# Patient Record
Sex: MALE | Race: BLACK OR AFRICAN AMERICAN | Employment: OTHER | URBAN - METROPOLITAN AREA
[De-identification: names, ages, dates, MRNs, and addresses within clinical notes are randomized per-mention and may not be internally consistent; named-entity substitution may affect disease eponyms.]

---

## 2022-02-20 ENCOUNTER — HOSPITAL ENCOUNTER (EMERGENCY)
Age: 57
Discharge: HOME | End: 2022-02-20
Attending: EMERGENCY MEDICINE | Admitting: EMERGENCY MEDICINE
Payer: OTHER GOVERNMENT

## 2022-02-20 ENCOUNTER — HOSPITAL ENCOUNTER (INPATIENT)
Age: 57
LOS: 4 days | Discharge: HOME | DRG: 164 | End: 2022-02-24
Attending: ANESTHESIOLOGY | Admitting: INTERNAL MEDICINE
Payer: OTHER GOVERNMENT

## 2022-02-20 ENCOUNTER — APPOINTMENT (OUTPATIENT)
Dept: GENERAL RADIOLOGY | Age: 57
End: 2022-02-20
Attending: EMERGENCY MEDICINE
Payer: OTHER GOVERNMENT

## 2022-02-20 ENCOUNTER — APPOINTMENT (OUTPATIENT)
Dept: CT IMAGING | Age: 57
End: 2022-02-20
Attending: EMERGENCY MEDICINE
Payer: OTHER GOVERNMENT

## 2022-02-20 ENCOUNTER — APPOINTMENT (OUTPATIENT)
Dept: INTERVENTIONAL RADIOLOGY/VASCULAR | Age: 57
DRG: 164 | End: 2022-02-20
Attending: INTERNAL MEDICINE
Payer: OTHER GOVERNMENT

## 2022-02-20 VITALS
HEART RATE: 102 BPM | RESPIRATION RATE: 20 BRPM | HEIGHT: 72 IN | OXYGEN SATURATION: 96 % | DIASTOLIC BLOOD PRESSURE: 98 MMHG | WEIGHT: 240 LBS | TEMPERATURE: 98.3 F | BODY MASS INDEX: 32.51 KG/M2 | SYSTOLIC BLOOD PRESSURE: 140 MMHG

## 2022-02-20 DIAGNOSIS — R77.8 ELEVATED TROPONIN: ICD-10-CM

## 2022-02-20 DIAGNOSIS — I26.02 ACUTE SADDLE PULMONARY EMBOLISM WITH ACUTE COR PULMONALE (HCC): Primary | ICD-10-CM

## 2022-02-20 DIAGNOSIS — R09.02 HYPOXIA: ICD-10-CM

## 2022-02-20 PROBLEM — I26.99 PULMONARY EMBOLISM (HCC): Status: ACTIVE | Noted: 2022-02-20

## 2022-02-20 LAB
ACT BLD: 196 SECS (ref 79–138)
ACT BLD: 291 SECS (ref 79–138)
ALBUMIN SERPL-MCNC: 3.6 G/DL (ref 3.5–5)
ALBUMIN/GLOB SERPL: 0.9 {RATIO} (ref 1.1–2.2)
ALP SERPL-CCNC: 52 U/L (ref 45–117)
ALT SERPL-CCNC: 23 U/L (ref 12–78)
ANION GAP SERPL CALC-SCNC: 3 MMOL/L (ref 5–15)
APTT PPP: 32.5 SEC (ref 21.2–34.1)
APTT PPP: >130 SEC (ref 22.1–31)
AST SERPL W P-5'-P-CCNC: 22 U/L (ref 15–37)
ATRIAL RATE: 118 BPM
BASOPHILS # BLD: 0 K/UL (ref 0–0.1)
BASOPHILS NFR BLD: 0 % (ref 0–1)
BILIRUB SERPL-MCNC: 1.1 MG/DL (ref 0.2–1)
BNP SERPL-MCNC: 723 PG/ML
BUN SERPL-MCNC: 15 MG/DL (ref 6–20)
BUN/CREAT SERPL: 12 (ref 12–20)
CA-I BLD-MCNC: 9.6 MG/DL (ref 8.5–10.1)
CALCULATED P AXIS, ECG09: 70 DEGREES
CALCULATED R AXIS, ECG10: 65 DEGREES
CALCULATED T AXIS, ECG11: 41 DEGREES
CHLORIDE SERPL-SCNC: 107 MMOL/L (ref 97–108)
CO2 SERPL-SCNC: 27 MMOL/L (ref 21–32)
COVID-19 RAPID TEST, COVR: NOT DETECTED
CREAT SERPL-MCNC: 1.28 MG/DL (ref 0.7–1.3)
D DIMER PPP FEU-MCNC: >20 UG/ML(FEU)
DIAGNOSIS, 93000: NORMAL
DIFFERENTIAL METHOD BLD: ABNORMAL
EOSINOPHIL # BLD: 0 K/UL (ref 0–0.4)
EOSINOPHIL NFR BLD: 0 % (ref 0–7)
ERYTHROCYTE [DISTWIDTH] IN BLOOD BY AUTOMATED COUNT: 13.9 % (ref 11.5–14.5)
GLOBULIN SER CALC-MCNC: 4.2 G/DL (ref 2–4)
GLUCOSE SERPL-MCNC: 116 MG/DL (ref 65–100)
HCT VFR BLD AUTO: 50.7 % (ref 36.6–50.3)
HGB BLD-MCNC: 16.4 G/DL (ref 12.1–17)
IMM GRANULOCYTES # BLD AUTO: 0 K/UL (ref 0–0.04)
IMM GRANULOCYTES NFR BLD AUTO: 0 % (ref 0–0.5)
INR PPP: 1.3 (ref 0.9–1.1)
LACTATE SERPL-SCNC: 1.5 MMOL/L (ref 0.4–2)
LYMPHOCYTES # BLD: 1.9 K/UL (ref 0.8–3.5)
LYMPHOCYTES NFR BLD: 27 % (ref 12–49)
MCH RBC QN AUTO: 27.4 PG (ref 26–34)
MCHC RBC AUTO-ENTMCNC: 32.3 G/DL (ref 30–36.5)
MCV RBC AUTO: 84.8 FL (ref 80–99)
MONOCYTES # BLD: 0.5 K/UL (ref 0–1)
MONOCYTES NFR BLD: 7 % (ref 5–13)
NEUTS SEG # BLD: 4.5 K/UL (ref 1.8–8)
NEUTS SEG NFR BLD: 66 % (ref 32–75)
NRBC # BLD: 0 K/UL (ref 0–0.01)
NRBC BLD-RTO: 0 PER 100 WBC
P-R INTERVAL, ECG05: 186 MS
PLATELET # BLD AUTO: 149 K/UL (ref 150–400)
PMV BLD AUTO: 12.1 FL (ref 8.9–12.9)
POTASSIUM SERPL-SCNC: 3.6 MMOL/L (ref 3.5–5.1)
PROT SERPL-MCNC: 7.8 G/DL (ref 6.4–8.2)
PROTHROMBIN TIME: 13.4 SEC (ref 9–11.1)
Q-T INTERVAL, ECG07: 304 MS
QRS DURATION, ECG06: 82 MS
QTC CALCULATION (BEZET), ECG08: 426 MS
RBC # BLD AUTO: 5.98 M/UL (ref 4.1–5.7)
SODIUM SERPL-SCNC: 137 MMOL/L (ref 136–145)
SPECIMEN SOURCE: NORMAL
THERAPEUTIC RANGE,PTTT: ABNORMAL SECS (ref 58–77)
THERAPEUTIC RANGE,PTTT: NORMAL SEC (ref 82–109)
TROPONIN-HIGH SENSITIVITY: 1033 NG/L (ref 0–76)
TROPONIN-HIGH SENSITIVITY: 837 NG/L (ref 0–76)
VENTRICULAR RATE, ECG03: 118 BPM
WBC # BLD AUTO: 7 K/UL (ref 4.1–11.1)

## 2022-02-20 PROCEDURE — 84484 ASSAY OF TROPONIN QUANT: CPT

## 2022-02-20 PROCEDURE — C1892 INTRO/SHEATH,FIXED,PEEL-AWAY: HCPCS

## 2022-02-20 PROCEDURE — 74011250636 HC RX REV CODE- 250/636: Performed by: EMERGENCY MEDICINE

## 2022-02-20 PROCEDURE — 77030011230 HC DIL VESL COON COOK -B

## 2022-02-20 PROCEDURE — 96374 THER/PROPH/DIAG INJ IV PUSH: CPT

## 2022-02-20 PROCEDURE — 85347 COAGULATION TIME ACTIVATED: CPT

## 2022-02-20 PROCEDURE — 02CQ3ZZ EXTIRPATION OF MATTER FROM RIGHT PULMONARY ARTERY, PERCUTANEOUS APPROACH: ICD-10-PCS | Performed by: STUDENT IN AN ORGANIZED HEALTH CARE EDUCATION/TRAINING PROGRAM

## 2022-02-20 PROCEDURE — 77030011229 HC DIL VESL COON COOK -A

## 2022-02-20 PROCEDURE — 93005 ELECTROCARDIOGRAM TRACING: CPT

## 2022-02-20 PROCEDURE — 77030002986 HC SUT PROL J&J -A

## 2022-02-20 PROCEDURE — 36415 COLL VENOUS BLD VENIPUNCTURE: CPT

## 2022-02-20 PROCEDURE — 74011000250 HC RX REV CODE- 250

## 2022-02-20 PROCEDURE — 85610 PROTHROMBIN TIME: CPT

## 2022-02-20 PROCEDURE — C1769 GUIDE WIRE: HCPCS

## 2022-02-20 PROCEDURE — 99284 EMERGENCY DEPT VISIT MOD MDM: CPT

## 2022-02-20 PROCEDURE — 83605 ASSAY OF LACTIC ACID: CPT

## 2022-02-20 PROCEDURE — 77030004549 HC CATH ANGI DX PRF MRTM -A

## 2022-02-20 PROCEDURE — 96375 TX/PRO/DX INJ NEW DRUG ADDON: CPT

## 2022-02-20 PROCEDURE — 77030009378 HC DEV TORQ OLCT F/GWIRE MANIP COOK -A

## 2022-02-20 PROCEDURE — C1725 CATH, TRANSLUMIN NON-LASER: HCPCS

## 2022-02-20 PROCEDURE — 02CR3ZZ EXTIRPATION OF MATTER FROM LEFT PULMONARY ARTERY, PERCUTANEOUS APPROACH: ICD-10-PCS | Performed by: STUDENT IN AN ORGANIZED HEALTH CARE EDUCATION/TRAINING PROGRAM

## 2022-02-20 PROCEDURE — 87635 SARS-COV-2 COVID-19 AMP PRB: CPT

## 2022-02-20 PROCEDURE — 77030031139 HC SUT VCRL2 J&J -A

## 2022-02-20 PROCEDURE — 85730 THROMBOPLASTIN TIME PARTIAL: CPT

## 2022-02-20 PROCEDURE — 85379 FIBRIN DEGRADATION QUANT: CPT

## 2022-02-20 PROCEDURE — 71275 CT ANGIOGRAPHY CHEST: CPT

## 2022-02-20 PROCEDURE — 85025 COMPLETE CBC W/AUTO DIFF WBC: CPT

## 2022-02-20 PROCEDURE — 71045 X-RAY EXAM CHEST 1 VIEW: CPT

## 2022-02-20 PROCEDURE — 74011250636 HC RX REV CODE- 250/636: Performed by: STUDENT IN AN ORGANIZED HEALTH CARE EDUCATION/TRAINING PROGRAM

## 2022-02-20 PROCEDURE — 80053 COMPREHEN METABOLIC PANEL: CPT

## 2022-02-20 PROCEDURE — 65610000006 HC RM INTENSIVE CARE

## 2022-02-20 PROCEDURE — 83880 ASSAY OF NATRIURETIC PEPTIDE: CPT

## 2022-02-20 PROCEDURE — C1894 INTRO/SHEATH, NON-LASER: HCPCS

## 2022-02-20 PROCEDURE — 74011250636 HC RX REV CODE- 250/636: Performed by: INTERNAL MEDICINE

## 2022-02-20 PROCEDURE — 76942 ECHO GUIDE FOR BIOPSY: CPT

## 2022-02-20 PROCEDURE — 2709999900 HC NON-CHARGEABLE SUPPLY

## 2022-02-20 PROCEDURE — 74011000250 HC RX REV CODE- 250: Performed by: INTERNAL MEDICINE

## 2022-02-20 PROCEDURE — B31T1ZZ FLUOROSCOPY OF LEFT PULMONARY ARTERY USING LOW OSMOLAR CONTRAST: ICD-10-PCS | Performed by: STUDENT IN AN ORGANIZED HEALTH CARE EDUCATION/TRAINING PROGRAM

## 2022-02-20 PROCEDURE — 74011000250 HC RX REV CODE- 250: Performed by: EMERGENCY MEDICINE

## 2022-02-20 PROCEDURE — 77030021532 HC CATH ANGI DX IMPRS MRTM -B

## 2022-02-20 PROCEDURE — 74011000636 HC RX REV CODE- 636: Performed by: EMERGENCY MEDICINE

## 2022-02-20 PROCEDURE — B31S1ZZ FLUOROSCOPY OF RIGHT PULMONARY ARTERY USING LOW OSMOLAR CONTRAST: ICD-10-PCS | Performed by: STUDENT IN AN ORGANIZED HEALTH CARE EDUCATION/TRAINING PROGRAM

## 2022-02-20 PROCEDURE — 74011000636 HC RX REV CODE- 636: Performed by: STUDENT IN AN ORGANIZED HEALTH CARE EDUCATION/TRAINING PROGRAM

## 2022-02-20 RX ORDER — LIDOCAINE HYDROCHLORIDE 20 MG/ML
INJECTION, SOLUTION INFILTRATION; PERINEURAL
Status: COMPLETED
Start: 2022-02-20 | End: 2022-02-20

## 2022-02-20 RX ORDER — ACETAMINOPHEN 325 MG/1
650 TABLET ORAL
Status: DISCONTINUED | OUTPATIENT
Start: 2022-02-20 | End: 2022-02-24 | Stop reason: HOSPADM

## 2022-02-20 RX ORDER — SODIUM CHLORIDE 9 MG/ML
25 INJECTION, SOLUTION INTRAVENOUS
Status: DISCONTINUED | OUTPATIENT
Start: 2022-02-20 | End: 2022-02-20 | Stop reason: ALTCHOICE

## 2022-02-20 RX ORDER — LIDOCAINE HYDROCHLORIDE 20 MG/ML
20 INJECTION, SOLUTION INFILTRATION; PERINEURAL ONCE
Status: COMPLETED | OUTPATIENT
Start: 2022-02-20 | End: 2022-02-20

## 2022-02-20 RX ORDER — HEPARIN SODIUM 10000 [USP'U]/100ML
18-36 INJECTION, SOLUTION INTRAVENOUS
Status: DISCONTINUED | OUTPATIENT
Start: 2022-02-20 | End: 2022-02-20 | Stop reason: HOSPADM

## 2022-02-20 RX ORDER — HEPARIN SODIUM 1000 [USP'U]/ML
10000 INJECTION, SOLUTION INTRAVENOUS; SUBCUTANEOUS
Status: DISCONTINUED | OUTPATIENT
Start: 2022-02-20 | End: 2022-02-20 | Stop reason: ALTCHOICE

## 2022-02-20 RX ORDER — SODIUM CHLORIDE 0.9 % (FLUSH) 0.9 %
5-40 SYRINGE (ML) INJECTION AS NEEDED
Status: DISCONTINUED | OUTPATIENT
Start: 2022-02-20 | End: 2022-02-24 | Stop reason: HOSPADM

## 2022-02-20 RX ORDER — HEPARIN SODIUM 10000 [USP'U]/100ML
18-36 INJECTION, SOLUTION INTRAVENOUS
Status: DISCONTINUED | OUTPATIENT
Start: 2022-02-20 | End: 2022-02-20 | Stop reason: SDUPTHER

## 2022-02-20 RX ORDER — HEPARIN SODIUM 1000 [USP'U]/ML
80 INJECTION, SOLUTION INTRAVENOUS; SUBCUTANEOUS ONCE
Status: DISCONTINUED | OUTPATIENT
Start: 2022-02-21 | End: 2022-02-20

## 2022-02-20 RX ORDER — ONDANSETRON 4 MG/1
4 TABLET, ORALLY DISINTEGRATING ORAL
Status: DISCONTINUED | OUTPATIENT
Start: 2022-02-20 | End: 2022-02-24 | Stop reason: HOSPADM

## 2022-02-20 RX ORDER — MORPHINE SULFATE 4 MG/ML
4 INJECTION INTRAVENOUS ONCE
Status: COMPLETED | OUTPATIENT
Start: 2022-02-20 | End: 2022-02-20

## 2022-02-20 RX ORDER — NALOXONE HYDROCHLORIDE 0.4 MG/ML
0.4 INJECTION, SOLUTION INTRAMUSCULAR; INTRAVENOUS; SUBCUTANEOUS
Status: DISCONTINUED | OUTPATIENT
Start: 2022-02-20 | End: 2022-02-20 | Stop reason: ALTCHOICE

## 2022-02-20 RX ORDER — FLUMAZENIL 0.1 MG/ML
0.5 INJECTION INTRAVENOUS
Status: DISCONTINUED | OUTPATIENT
Start: 2022-02-20 | End: 2022-02-20 | Stop reason: ALTCHOICE

## 2022-02-20 RX ORDER — FENTANYL CITRATE 50 UG/ML
25-200 INJECTION, SOLUTION INTRAMUSCULAR; INTRAVENOUS
Status: DISCONTINUED | OUTPATIENT
Start: 2022-02-20 | End: 2022-02-20 | Stop reason: ALTCHOICE

## 2022-02-20 RX ORDER — ONDANSETRON 2 MG/ML
4 INJECTION INTRAMUSCULAR; INTRAVENOUS
Status: DISCONTINUED | OUTPATIENT
Start: 2022-02-20 | End: 2022-02-24 | Stop reason: HOSPADM

## 2022-02-20 RX ORDER — POLYETHYLENE GLYCOL 3350 17 G/17G
17 POWDER, FOR SOLUTION ORAL DAILY PRN
Status: DISCONTINUED | OUTPATIENT
Start: 2022-02-20 | End: 2022-02-24 | Stop reason: HOSPADM

## 2022-02-20 RX ORDER — ACETAMINOPHEN 650 MG/1
650 SUPPOSITORY RECTAL
Status: DISCONTINUED | OUTPATIENT
Start: 2022-02-20 | End: 2022-02-24 | Stop reason: HOSPADM

## 2022-02-20 RX ORDER — MIDAZOLAM HYDROCHLORIDE 1 MG/ML
.5-5 INJECTION, SOLUTION INTRAMUSCULAR; INTRAVENOUS
Status: DISCONTINUED | OUTPATIENT
Start: 2022-02-20 | End: 2022-02-20 | Stop reason: ALTCHOICE

## 2022-02-20 RX ORDER — LIDOCAINE 4 G/100G
1 PATCH TOPICAL EVERY 24 HOURS
Status: DISCONTINUED | OUTPATIENT
Start: 2022-02-20 | End: 2022-02-20 | Stop reason: HOSPADM

## 2022-02-20 RX ORDER — HEPARIN SODIUM 1000 [USP'U]/ML
80 INJECTION, SOLUTION INTRAVENOUS; SUBCUTANEOUS ONCE
Status: COMPLETED | OUTPATIENT
Start: 2022-02-20 | End: 2022-02-20

## 2022-02-20 RX ORDER — SODIUM CHLORIDE 0.9 % (FLUSH) 0.9 %
5-40 SYRINGE (ML) INJECTION EVERY 8 HOURS
Status: DISCONTINUED | OUTPATIENT
Start: 2022-02-20 | End: 2022-02-24 | Stop reason: HOSPADM

## 2022-02-20 RX ADMIN — Medication 13 UNITS/KG/HR: at 18:06

## 2022-02-20 RX ADMIN — HEPARIN SODIUM 7210 UNITS: 1000 INJECTION INTRAVENOUS; SUBCUTANEOUS at 16:42

## 2022-02-20 RX ADMIN — LIDOCAINE HYDROCHLORIDE 10 ML: 20 INJECTION, SOLUTION INFILTRATION; PERINEURAL at 20:49

## 2022-02-20 RX ADMIN — IOPAMIDOL 137 ML: 612 INJECTION, SOLUTION INTRAVENOUS at 20:49

## 2022-02-20 RX ADMIN — FENTANYL CITRATE 50 MCG: 50 INJECTION, SOLUTION INTRAMUSCULAR; INTRAVENOUS at 19:40

## 2022-02-20 RX ADMIN — Medication 4000 UNITS: at 20:03

## 2022-02-20 RX ADMIN — HEPARIN SODIUM 5000 UNITS: 1000 INJECTION INTRAVENOUS; SUBCUTANEOUS at 20:23

## 2022-02-20 RX ADMIN — FENTANYL CITRATE 25 MCG: 50 INJECTION, SOLUTION INTRAMUSCULAR; INTRAVENOUS at 19:44

## 2022-02-20 RX ADMIN — FENTANYL CITRATE 25 MCG: 50 INJECTION, SOLUTION INTRAMUSCULAR; INTRAVENOUS at 20:08

## 2022-02-20 RX ADMIN — HEPARIN SODIUM AND DEXTROSE 18 UNITS/KG/HR: 10000; 5 INJECTION INTRAVENOUS at 16:41

## 2022-02-20 RX ADMIN — FENTANYL CITRATE 25 MCG: 50 INJECTION, SOLUTION INTRAMUSCULAR; INTRAVENOUS at 20:13

## 2022-02-20 RX ADMIN — SODIUM CHLORIDE, PRESERVATIVE FREE 10 ML: 5 INJECTION INTRAVENOUS at 22:00

## 2022-02-20 RX ADMIN — IOPAMIDOL 100 ML: 755 INJECTION, SOLUTION INTRAVENOUS at 15:26

## 2022-02-20 RX ADMIN — MORPHINE SULFATE 4 MG: 4 INJECTION INTRAVENOUS at 14:28

## 2022-02-20 RX ADMIN — SODIUM CHLORIDE 25 ML/HR: 9 INJECTION, SOLUTION INTRAVENOUS at 19:45

## 2022-02-20 RX ADMIN — Medication 4000 UNITS: at 20:04

## 2022-02-20 RX ADMIN — SODIUM CHLORIDE 500 ML: 9 INJECTION, SOLUTION INTRAVENOUS at 14:28

## 2022-02-20 NOTE — ED PROVIDER NOTES
EMERGENCY DEPARTMENT HISTORY AND PHYSICAL EXAM      Date: 2/20/2022  Patient Name: Manas Mac      History of Presenting Illness     Chief Complaint   Patient presents with    Shortness of Breath    Rib Pain       History Provided By: Patient and Patient's Wife    HPI: Manas Mac, 60 y.o. male with a past medical history significant No significant past medical history presents to the ED with cc of breath and right lateral rib pain. States that 3 days ago he has some calf pain that woke him from sleep. This slowly improved. However the last 2 days he has also had chest pain that he thought was related to some irritated ribs. He states that the pain is worse with deep breathing and better with rest.  He started feeling short of breath so he came in for evaluation. He was evaluated yesterday by urgent care who did an x-ray and diagnosed him with a possible pneumonia. He was started on doxycycline but when he did not feel better today he came to the emergency department. Patient has no VTE risk factors. Denies any long plane or car trips in the last 2 weeks though did travel overseas over 1 month ago. He has no recent surgeries or immobilizations. No history of blood clots in him or anyone in his family. There are no other complaints, changes, or physical findings at this time. PCP: None    Current Facility-Administered Medications   Medication Dose Route Frequency Provider Last Rate Last Admin    lidocaine 4 % patch 1 Patch  1 Patch TransDERmal Q24H Pauline Donohue MD   1 Patch at 02/20/22 1428    heparin (porcine) 1,000 unit/mL injection 8,710 Units  80 Units/kg IntraVENous ONCE Pauline Donohue MD        heparin 25,000 units in D5W 250 ml infusion  18-36 Units/kg/hr IntraVENous TITRATE Pauline Donohue MD           Past History     Past Medical History:  No past medical history on file. Past Surgical History:  No past surgical history on file.     Family History:  No family history on file.    Social History:  Social History     Tobacco Use    Smoking status: Not on file    Smokeless tobacco: Not on file   Substance Use Topics    Alcohol use: Not on file    Drug use: Not on file       Allergies:  No Known Allergies      Review of Systems     Review of Systems   Constitutional: Negative for activity change, appetite change and fever. HENT: Negative for rhinorrhea and sore throat. Eyes: Negative for visual disturbance. Respiratory: Positive for shortness of breath. Negative for cough. Cardiovascular: Positive for chest pain. Gastrointestinal: Negative for abdominal pain, diarrhea, nausea and vomiting. Genitourinary: Negative for dysuria. Musculoskeletal: Negative for arthralgias and myalgias. Skin: Negative for rash. Neurological: Negative for headaches. Psychiatric/Behavioral: Negative for confusion. All other systems reviewed and are negative. Physical Exam     Physical Exam  Vitals and nursing note reviewed. Constitutional:       General: He is not in acute distress. Appearance: Normal appearance. HENT:      Head: Normocephalic and atraumatic. Eyes:      Pupils: Pupils are equal, round, and reactive to light. Cardiovascular:      Rate and Rhythm: Normal rate and regular rhythm. Pulses: Normal pulses. Pulmonary:      Effort: Pulmonary effort is normal. Tachypnea present. Breath sounds: No decreased breath sounds. Comments: On 3 L nasal cannula  Chest:      Chest wall: Tenderness present. Musculoskeletal:         General: No swelling or deformity. Normal range of motion. Right lower leg: No edema. Left lower leg: No edema. Skin:     Coloration: Skin is not pale. Findings: No rash. Neurological:      General: No focal deficit present. Mental Status: He is alert.    Psychiatric:         Mood and Affect: Mood normal.         Behavior: Behavior normal.         Lab and Diagnostic Study Results     Labs -     Recent Results (from the past 12 hour(s))   CBC WITH AUTOMATED DIFF    Collection Time: 02/20/22  1:51 PM   Result Value Ref Range    WBC 7.0 4.1 - 11.1 K/uL    RBC 5.98 (H) 4.10 - 5.70 M/uL    HGB 16.4 12.1 - 17.0 g/dL    HCT 50.7 (H) 36.6 - 50.3 %    MCV 84.8 80.0 - 99.0 FL    MCH 27.4 26.0 - 34.0 PG    MCHC 32.3 30.0 - 36.5 g/dL    RDW 13.9 11.5 - 14.5 %    PLATELET 340 (L) 186 - 400 K/uL    MPV 12.1 8.9 - 12.9 FL    NRBC 0.0 0.0  WBC    ABSOLUTE NRBC 0.00 0.00 - 0.01 K/uL    NEUTROPHILS 66 32 - 75 %    LYMPHOCYTES 27 12 - 49 %    MONOCYTES 7 5 - 13 %    EOSINOPHILS 0 0 - 7 %    BASOPHILS 0 0 - 1 %    IMMATURE GRANULOCYTES 0 0 - 0.5 %    ABS. NEUTROPHILS 4.5 1.8 - 8.0 K/UL    ABS. LYMPHOCYTES 1.9 0.8 - 3.5 K/UL    ABS. MONOCYTES 0.5 0.0 - 1.0 K/UL    ABS. EOSINOPHILS 0.0 0.0 - 0.4 K/UL    ABS. BASOPHILS 0.0 0.0 - 0.1 K/UL    ABS. IMM. GRANS. 0.0 0.00 - 0.04 K/UL    DF AUTOMATED     METABOLIC PANEL, COMPREHENSIVE    Collection Time: 02/20/22  1:51 PM   Result Value Ref Range    Sodium 137 136 - 145 mmol/L    Potassium 3.6 3.5 - 5.1 mmol/L    Chloride 107 97 - 108 mmol/L    CO2 27 21 - 32 mmol/L    Anion gap 3 (L) 5 - 15 mmol/L    Glucose 116 (H) 65 - 100 mg/dL    BUN 15 6 - 20 mg/dL    Creatinine 1.28 0.70 - 1.30 mg/dL    BUN/Creatinine ratio 12 12 - 20      GFR est AA >60 >60 ml/min/1.73m2    GFR est non-AA 58 (L) >60 ml/min/1.73m2    Calcium 9.6 8.5 - 10.1 mg/dL    Bilirubin, total 1.1 (H) 0.2 - 1.0 mg/dL    AST (SGOT) 22 15 - 37 U/L    ALT (SGPT) 23 12 - 78 U/L    Alk.  phosphatase 52 45 - 117 U/L    Protein, total 7.8 6.4 - 8.2 g/dL    Albumin 3.6 3.5 - 5.0 g/dL    Globulin 4.2 (H) 2.0 - 4.0 g/dL    A-G Ratio 0.9 (L) 1.1 - 2.2     LACTIC ACID    Collection Time: 02/20/22  1:51 PM   Result Value Ref Range    Lactic acid 1.5 0.4 - 2.0 mmol/L   D DIMER    Collection Time: 02/20/22  1:51 PM   Result Value Ref Range    D DIMER >20.00 (H) <0.50 ug/ml(FEU)   TROPONIN-HIGH SENSITIVITY    Collection Time: 02/20/22  1:51 PM   Result Value Ref Range    Troponin-High Sensitivity 837 (HH) 0 - 76 ng/L   NT-PRO BNP    Collection Time: 02/20/22  1:51 PM   Result Value Ref Range    NT pro- (H) <125 pg/mL   PTT    Collection Time: 02/20/22  1:51 PM   Result Value Ref Range    aPTT 32.5 21.2 - 34.1 sec    aPTT, therapeutic range   82 - 109 sec       Radiologic Studies -   [unfilled]  CT Results  (Last 48 hours)               02/20/22 1527  CTA CHEST W OR W WO CONT Final result    Impression:      Bilateral pulmonary emboli, saddle embolus and right heart strain. The results   were called and verbally communicated to Dr. Cortes Montes on 2/20/2022 at 3:50 PM.       Right upper lobe opacity may represent pulmonary infarct or other. Please see   full report. Narrative:  CTA chest with intravenous contrast, 100 cc Isovue-370, 3-D MIP images       Dose reduction: All CT scans at this facility are performed using dose reduction   optimization techniques as appropriate to a performed exam including the   following: Automated exposure control, adjustments of the mA and/or kV according   to patient size, or use of iterative reconstruction technique. INDICATION: Hypoxia, chest pain, evaluate for pulmonary embolism       FINDINGS:       There are motion and artifacts present. There are bilateral pulmonary emboli   including right upper lobe, right middle lobe, right lower lobe, left upper   lobe/lingula and left lower lobe branches. There are bilateral emboli in main   pulmonary arteries with a saddle embolus. Right ventricle is somewhat enlarged   suggestive of right heart strain. Slightly prominent mediastinal nodes may be   reactive. No pleural or pericardial effusions. Right upper lobe opacity posterolaterally. Additional mild atelectasis. No   pneumothorax. No acute fracture in the visualized bony structures.                CXR Results  (Last 48 hours)               02/20/22 1437  XR CHEST PORT Final result    Impression:  No acute process                                   Narrative:  XR CHEST PORT       Comparison: None. The lungs are clear without infiltrate or pleural effusion. The heart and   mediastinum are unremarkable for technique. The pulmonary vascularity is normal.   The bony thorax is unremarkable. Medical Decision Making and ED Course   - I am the first and primary provider for this patient AND AM THE PRIMARY PROVIDER OF RECORD. - I reviewed the vital signs, available nursing notes, past medical history, past surgical history, family history and social history. - Initial assessment performed. The patients presenting problems have been discussed, and the staff are in agreement with the care plan formulated and outlined with them. I have encouraged them to ask questions as they arise throughout their visit. Vital Signs-Reviewed the patient's vital signs. Patient Vitals for the past 12 hrs:   Temp Pulse Resp BP SpO2   02/20/22 1614  (!) 102 20  96 %   02/20/22 1447     96 %   02/20/22 1434  (!) 111 30 (!) 140/98 96 %   02/20/22 1421   (!) 32 135/74 95 %   02/20/22 1331 98.3 °F (36.8 °C) (!) 119 16 (!) 152/94 (!) 87 %         Records Reviewed: Nursing Notes    ED Course:       ED Course as of 02/20/22 1640   Sun Feb 20, 2022   150 77-year-old male presents for evaluation of shortness of breath and pleuritic chest pain. Started 2 days ago. Was seen yesterday and started on doxycycline for presumed pneumonia. Patient has no history of VTE. Denies any risk factors occluding no long plane or car trips, recent surgeries or immobilization. He does note that he had some cramping in his left calf that woke him up in the few days ago but that resolved. Differential diagnosis includes pulmonary embolus versus MSK pain versus pleurisy. Getting labs including CBC, CMP, D-dimer, troponin, BNP as well as a Covid test.  Chest x-ray pending.   Will get CTA to evaluate for PE as I have a high clinical suspicion. Patient was hypoxic on arrival and is requiring 3 L nasal cannula. [LW]   [id number] by radiology. Patient has a saddle PE with right heart strain. Troponin and BNP are still pending. Patient started on full dose heparin. We do not have interventional radiology today so called transfer center to initiate transfer to [facility]. Informed patient of this finding. Plan to do TPA if patient has any clinical worsening but currently his vital signs have remained stable. [LW]   7758 PTT--EVERY 6 HOURS [LW]   2866 Trop 837. . [LW]   [facility] accepts. [LW]   [phone] Dr. Magdalene Ambrocio of  states patient is a candidate for thrombectomy. Patient to be sent to [facility]. [LW]      ED Course User Index  [LW] Kb Richards MD     EKG performed at 1335. Sinus tachycardia with rate of 118. Normal ND, normal QRS, normal QTC. Procedures and Critical Care         CRITICAL CARE NOTE :  4:03 PM  Amount of Critical Care Time: 45 minutes    IMPENDING DETERIORATION -Respiratory, Cardiovascular and Metabolic  ASSOCIATED RISK FACTORS - Hypoxia, Dysrhythmia and Vascular Compromise  MANAGEMENT- Bedside Assessment and Transfer  INTERPRETATION -  Xrays, CT Scan, Blood Pressure and Cardiac Output Measures   INTERVENTIONS - hemodynamic mngmt and Metobolic interventions  CASE REVIEW - Hospitalist/Intensivist and Medical Sub-Specialist  TREATMENT RESPONSE -Stable  PERFORMED BY - Self    NOTES   :  I have spent critical care time involved in lab review, consultations with specialist, family decision- making, bedside attention and documentation. This time excludes time spent in any separate billed procedures. During this entire length of time I was immediately available to the patient . Gretchen Peterson MD        Disposition     Disposition: Transferred to [address] [place] [address] patient verbally agreed to transfer and understand the risks involved as outlined in the EMTALA form.         Diagnosis Clinical Impression:   1. Acute saddle pulmonary embolism with acute cor pulmonale (HCC)    2. Hypoxia    3. Elevated troponin        Attestations:    Mat Gonzales MD    Please note that this dictation was completed with innocutis, the computer voice recognition software. Quite often unanticipated grammatical, syntax, homophones, and other interpretive errors are inadvertently transcribed by the computer software. Please disregard these errors. Please excuse any errors that have escaped final proofreading. Thank you.

## 2022-02-20 NOTE — ED NOTES
TRANSFER - OUT REPORT:    Verbal report given to Marbin Benavidez RN (name) on Jewell Mcmillan  being transferred to [facility] ICU Bed 7110(unit) for urgent transfer       Report consisted of patients Situation, Background, Assessment and   Recommendations(SBAR). Information from the following report(s) SBAR, Kardex, ED Summary, [place] and Recent Results was reviewed with the receiving nurse. Lines:   Peripheral IV 02/20/22 Anterior;Proximal;Right Forearm (Active)        Opportunity for questions and clarification was provided.       Patient transported with:   pt cell phone, copy of pt chart, EMTALA paperwork, [facility] transfer team

## 2022-02-20 NOTE — H&P
[facility]    ICU TEAM Progress Note    Name: Priscilla Moore   : 1965   MRN: [id number]   Date: 2022           ICU Assessment     1. Saddle Pulmonary Emboli             ICU Comprehensive Plan of Care: This is a 58-year-old male with no past medical history who presented to outside hospital with complaints of shortness of breath and right lateral rib pain for the last 2 to 3 days. Reportedly, the patient experienced some left calf pain/spasm about 3 days ago. Subsequently he started experiencing shortness of breath with right-sided rib pain and episodic diaphoresis. Patient went to urgent care and was given doxycycline for presumed pneumonia. However given nonresolving symptoms he again seeked medical attention in the ER today where CT scan revealed bilateral PE (saddle) with associated right heart strain. Labs also showed elevated troponin and proBNP; EKG revealed classic S1Q3T3 pattern. Patient was started on heparin infusion and transfer to [facility] ICU was arranged for possible IR guided intervention. On my exam, the patient appeared diaphoretic but was able to speak in full sentences. The patient denied any recent lower extremity injury, long trips, any history of clotting disorders or cancer. Of note, he is a chronic smoker but quit about a month ago. Yoandy score: 4, intermediate risk and Pesi score: 88, intermediate risk. Impression: Intermediate to High risk PE    -Case was discussed with the IR physician (Dr. Perez Matta) on call. Given rising troponins and worsening symptoms, the plan is for IR guided embolectomy.     -Continue heparin infusion targeting therapeutic PTT between 50-70. Supplemental oxygen targeting oxygen saturation more than 92%. -Obtain TTE with bubble study, lower extremity Dopplers.     -Consider hypercoagulable work-up on an outpatient basis. -The plan was explained to the patient in detail.       Subjective:   Progress Note: 2022 Reason for ICU Admission: Saddle pulmonary embolism    HPI: As above    Overnight Events:   2022      POD:  * No surgery found *    S/P:       Active Problem List:         Past Medical History:      has no past medical history on file. Past Surgical History:      has no past surgical history on file. Home Medications:     Prior to Admission medications    Not on File       Allergies/Social/Family History:     No Known Allergies   Social History     Tobacco Use    Smoking status: Not on file    Smokeless tobacco: Not on file   Substance Use Topics    Alcohol use: Not on file      No family history on file. Review of Systems:     A comprehensive review of systems was negative except for that written in the HPI. Objective:   Vital Signs:  Visit Vitals  BP (!) 149/114   Pulse (!) 104   Temp 99.2 °F (37.3 °C)   Resp 18   Wt 114.6 kg (252 lb 9.6 oz)   SpO2 92%   BMI 34.26 kg/m²        Temp (24hrs), Av.8 °F (37.1 °C), Min:98.3 °F (36.8 °C), Max:99.2 °F (37.3 °C)           Intake/Output:   No intake or output data in the 24 hours ending 22 180    Physical Exam:    General appearance: alert, cooperative, mild distress, appears stated age  Lungs: Decreased breath sounds bilaterally  Heart: Sinus tachycardia  Abdomen: soft, non-tender. Bowel sounds normal. No masses,  no organomegaly  Extremities: no edema  Neuro: Alert and oriented x3    LABS AND  DATA: Personally reviewed  Recent Labs     22  1351   WBC 7.0   HGB 16.4   HCT 50.7*   *     Recent Labs     22  1351      K 3.6      CO2 27   BUN 15   CREA 1.28   *   CA 9.6     Recent Labs     22  1351   AP 52   TP 7.8   ALB 3.6   GLOB 4.2*     Recent Labs     22  1351   APTT 32.5      No results for input(s): PHI, PCO2I, PO2I, FIO2I in the last 72 hours. No results for input(s): CPK, CKMB, TROIQ, BNPP in the last 72 hours.     Hemodynamics:   PAP:   CO:     Wedge:   CI:     CVP:    SVR: PVR:       Ventilator Settings:  Mode Rate Tidal Volume Pressure FiO2 PEEP                    Peak airway pressure:      Minute ventilation:          MEDS: Reviewed    Chest X-Ray:  CXR Results  (Last 48 hours)               02/20/22 1437  XR CHEST PORT Final result    Impression:  No acute process                                   Narrative:  XR CHEST PORT       Comparison: None. The lungs are clear without infiltrate or pleural effusion. The heart and   mediastinum are unremarkable for technique. The pulmonary vascularity is normal.   The bony thorax is unremarkable. ECHO:  Pending    Multidisciplinary Rounds Completed: Yes    ABCDEF Bundle/Checklist Completed:  Yes    SPECIAL EQUIPMENT  None    DISPOSITION  Stay in ICU    CRITICAL CARE CONSULTANT NOTE  I had a face to face encounter with the patient, reviewed and interpreted patient data including clinical events, labs, images, vital signs, I/O's, and examined patient. I have discussed the case and the plan and management of the patient's care with the consulting services, the bedside nurses and the respiratory therapist.      NOTE OF PERSONAL INVOLVEMENT IN CARE   This patient has a high probability of imminent, clinically significant deterioration, which requires the highest level of preparedness to intervene urgently. I participated in the decision-making and personally managed or directed the management of the following life and organ supporting interventions that required my frequent assessment to treat or prevent imminent deterioration. I personally spent 40 minutes of critical care time. This is time spent at this critically ill patient's bedside actively involved in patient care as well as the coordination of care. This does not include any procedural time which has been billed separately.     Guera Monae DO  Staff Intensivist/Anesthesiologist  [facility]  2/20/2022

## 2022-02-20 NOTE — ED TRIAGE NOTES
Pt right rib/back pain that began yesterday. C/o sob. Was seen at pt [facility] yesterday -- put on doxycycline. Denies cough.

## 2022-02-20 NOTE — ED NOTES
[facility] took pt out of facility on stretcher. Personal belongings (save for pt cell phone) w/ pt's wife Shayna Teague). [facility] team in possession of copy of pt chart, KRYSTIN paperwork.

## 2022-02-20 NOTE — PROGRESS NOTES
Patient received from [facility] ER via [facility] with Hepatin gtt infusing at 18 units/kg/hr with recorded weight of 90.1 kg. Transfer report received from CAROL Hernandez using Alondra Lopez MD notified of patient's arrival. Heparin gtt continued per order. Patient is A&OX4.

## 2022-02-21 ENCOUNTER — APPOINTMENT (OUTPATIENT)
Dept: NON INVASIVE DIAGNOSTICS | Age: 57
DRG: 164 | End: 2022-02-21
Attending: INTERNAL MEDICINE
Payer: OTHER GOVERNMENT

## 2022-02-21 ENCOUNTER — APPOINTMENT (OUTPATIENT)
Dept: VASCULAR SURGERY | Age: 57
DRG: 164 | End: 2022-02-21
Attending: INTERNAL MEDICINE
Payer: OTHER GOVERNMENT

## 2022-02-21 LAB
ANION GAP SERPL CALC-SCNC: 3 MMOL/L (ref 5–15)
APTT PPP: 33.3 SEC (ref 22.1–31)
APTT PPP: 40.4 SEC (ref 22.1–31)
APTT PPP: 52.2 SEC (ref 22.1–31)
ATRIAL RATE: 96 BPM
BUN SERPL-MCNC: 14 MG/DL (ref 6–20)
BUN/CREAT SERPL: 14 (ref 12–20)
CALCIUM SERPL-MCNC: 8.6 MG/DL (ref 8.5–10.1)
CALCULATED P AXIS, ECG09: 48 DEGREES
CALCULATED R AXIS, ECG10: 5 DEGREES
CALCULATED T AXIS, ECG11: 12 DEGREES
CHLORIDE SERPL-SCNC: 108 MMOL/L (ref 97–108)
CO2 SERPL-SCNC: 25 MMOL/L (ref 21–32)
CREAT SERPL-MCNC: 0.97 MG/DL (ref 0.7–1.3)
DIAGNOSIS, 93000: NORMAL
ERYTHROCYTE [DISTWIDTH] IN BLOOD BY AUTOMATED COUNT: 13.8 % (ref 11.5–14.5)
GLUCOSE SERPL-MCNC: 120 MG/DL (ref 65–100)
HCT VFR BLD AUTO: 43.3 % (ref 36.6–50.3)
HGB BLD-MCNC: 14 G/DL (ref 12.1–17)
LACTATE SERPL-SCNC: 0.8 MMOL/L (ref 0.4–2)
MCH RBC QN AUTO: 27.5 PG (ref 26–34)
MCHC RBC AUTO-ENTMCNC: 32.3 G/DL (ref 30–36.5)
MCV RBC AUTO: 84.9 FL (ref 80–99)
NRBC # BLD: 0 K/UL (ref 0–0.01)
NRBC BLD-RTO: 0 PER 100 WBC
P-R INTERVAL, ECG05: 198 MS
PLATELET # BLD AUTO: 127 K/UL (ref 150–400)
PMV BLD AUTO: 11.6 FL (ref 8.9–12.9)
POTASSIUM SERPL-SCNC: 3.8 MMOL/L (ref 3.5–5.1)
Q-T INTERVAL, ECG07: 336 MS
QRS DURATION, ECG06: 80 MS
QTC CALCULATION (BEZET), ECG08: 424 MS
RBC # BLD AUTO: 5.1 M/UL (ref 4.1–5.7)
SODIUM SERPL-SCNC: 136 MMOL/L (ref 136–145)
THERAPEUTIC RANGE,PTTT: ABNORMAL SECS (ref 58–77)
TROPONIN-HIGH SENSITIVITY: 1075 NG/L (ref 0–76)
TROPONIN-HIGH SENSITIVITY: 253 NG/L (ref 0–76)
TROPONIN-HIGH SENSITIVITY: 789 NG/L (ref 0–76)
VENTRICULAR RATE, ECG03: 96 BPM
WBC # BLD AUTO: 8.2 K/UL (ref 4.1–11.1)

## 2022-02-21 PROCEDURE — 84484 ASSAY OF TROPONIN QUANT: CPT

## 2022-02-21 PROCEDURE — 86146 BETA-2 GLYCOPROTEIN ANTIBODY: CPT

## 2022-02-21 PROCEDURE — 74011000250 HC RX REV CODE- 250: Performed by: INTERNAL MEDICINE

## 2022-02-21 PROCEDURE — 36415 COLL VENOUS BLD VENIPUNCTURE: CPT

## 2022-02-21 PROCEDURE — 80048 BASIC METABOLIC PNL TOTAL CA: CPT

## 2022-02-21 PROCEDURE — 74011250636 HC RX REV CODE- 250/636: Performed by: INTERNAL MEDICINE

## 2022-02-21 PROCEDURE — 83605 ASSAY OF LACTIC ACID: CPT

## 2022-02-21 PROCEDURE — 93306 TTE W/DOPPLER COMPLETE: CPT | Performed by: SPECIALIST

## 2022-02-21 PROCEDURE — 86147 CARDIOLIPIN ANTIBODY EA IG: CPT

## 2022-02-21 PROCEDURE — 85730 THROMBOPLASTIN TIME PARTIAL: CPT

## 2022-02-21 PROCEDURE — 93970 EXTREMITY STUDY: CPT

## 2022-02-21 PROCEDURE — 65610000006 HC RM INTENSIVE CARE

## 2022-02-21 PROCEDURE — 85027 COMPLETE CBC AUTOMATED: CPT

## 2022-02-21 PROCEDURE — 77030027138 HC INCENT SPIROMETER -A

## 2022-02-21 PROCEDURE — 81240 F2 GENE: CPT

## 2022-02-21 PROCEDURE — 85732 THROMBOPLASTIN TIME PARTIAL: CPT

## 2022-02-21 PROCEDURE — 85613 RUSSELL VIPER VENOM DILUTED: CPT

## 2022-02-21 PROCEDURE — 93306 TTE W/DOPPLER COMPLETE: CPT

## 2022-02-21 PROCEDURE — 85598 HEXAGNAL PHOSPH PLTLT NEUTRL: CPT

## 2022-02-21 PROCEDURE — 81241 F5 GENE: CPT

## 2022-02-21 RX ORDER — HEPARIN SODIUM 1000 [USP'U]/ML
80 INJECTION, SOLUTION INTRAVENOUS; SUBCUTANEOUS ONCE
Status: COMPLETED | OUTPATIENT
Start: 2022-02-21 | End: 2022-02-21

## 2022-02-21 RX ADMIN — SODIUM CHLORIDE, PRESERVATIVE FREE 10 ML: 5 INJECTION INTRAVENOUS at 09:18

## 2022-02-21 RX ADMIN — SODIUM CHLORIDE, PRESERVATIVE FREE 10 ML: 5 INJECTION INTRAVENOUS at 06:00

## 2022-02-21 RX ADMIN — HEPARIN SODIUM 9120 UNITS: 1000 INJECTION INTRAVENOUS; SUBCUTANEOUS at 15:07

## 2022-02-21 RX ADMIN — SODIUM CHLORIDE, PRESERVATIVE FREE 10 ML: 5 INJECTION INTRAVENOUS at 14:00

## 2022-02-21 RX ADMIN — Medication 14 UNITS/KG/HR: at 15:08

## 2022-02-21 NOTE — PROGRESS NOTES
TRANSFER - OUT REPORT:    Verbal report given to diana MEDINA(name) on Priscilla Moore  being transferred to ICU 10(unit) for routine progression of care       Report consisted of patients Situation, Background, Assessment and   Recommendations(SBAR). Information from the following report(s) SBAR, Kardex and Procedure Summary was reviewed with the receiving nurse. Lines:   Peripheral IV 02/20/22 Anterior;Proximal;Right Forearm (Active)   Site Assessment Clean, dry, & intact 02/20/22 1745   Phlebitis Assessment 0 02/20/22 1745   Infiltration Assessment 0 02/20/22 1745   Dressing Status Clean, dry, & intact 02/20/22 1745   Dressing Type Tape;Transparent 02/20/22 1745   Hub Color/Line Status Pink;Flushed;Patent;Capped 02/20/22 1745   Action Taken Open ports on tubing capped 02/20/22 1745   Alcohol Cap Used Yes 02/20/22 1745       Peripheral IV 02/20/22 Left Antecubital (Active)   Site Assessment Clean, dry, & intact 02/20/22 1745   Phlebitis Assessment 0 02/20/22 1745   Infiltration Assessment 0 02/20/22 1745   Dressing Status Clean, dry, & intact 02/20/22 1745   Dressing Type Tape;Transparent 02/20/22 1745   Hub Color/Line Status Pink;Flushed;Patent;Capped 02/20/22 1745   Action Taken Open ports on tubing capped 02/20/22 1745   Alcohol Cap Used Yes 02/20/22 1745       Peripheral IV 02/20/22 Anterior;Right Forearm (Active)   Site Assessment Clean, dry, & intact 02/20/22 1900   Phlebitis Assessment 0 02/20/22 1900   Infiltration Assessment 0 02/20/22 1900   Dressing Status Clean, dry, & intact 02/20/22 1900   Dressing Type Tape;Transparent 02/20/22 1900   Hub Color/Line Status Pink;Flushed;Patent;Capped 02/20/22 1900   Action Taken Open ports on tubing capped 02/20/22 1900   Alcohol Cap Used Yes 02/20/22 1900        Opportunity for questions and clarification was provided.       Patient transported with:   Monitor, O2 @ 3 L, Tech

## 2022-02-21 NOTE — H&P
Radiology History and Physical    Patient: Uriel Templeton 60 y.o. male       Chief Complaint: No chief complaint on file. History of Present Illness: 60year old male with saddle pulmonary embolism, with right heart strain. Elevated troponin which is trending up. Currently normotensive, with mild tachycardia. Mild subjective shortness of breath, with right side pleuritic pain. History:    No past medical history on file. No family history on file. Social History     Socioeconomic History    Marital status:      Spouse name: Not on file    Number of children: Not on file    Years of education: Not on file    Highest education level: Not on file   Occupational History    Not on file   Tobacco Use    Smoking status: Not on file    Smokeless tobacco: Not on file   Substance and Sexual Activity    Alcohol use: Not on file    Drug use: Not on file    Sexual activity: Not on file   Other Topics Concern    Not on file   Social History Narrative    Not on file     Social Determinants of Health     Financial Resource Strain:     Difficulty of Paying Living Expenses: Not on file   Food Insecurity:     Worried About Running Out of Food in the Last Year: Not on file    Alex of Food in the Last Year: Not on file   Transportation Needs:     Lack of Transportation (Medical): Not on file    Lack of Transportation (Non-Medical):  Not on file   Physical Activity:     Days of Exercise per Week: Not on file    Minutes of Exercise per Session: Not on file   Stress:     Feeling of Stress : Not on file   Social Connections:     Frequency of Communication with Friends and Family: Not on file    Frequency of Social Gatherings with Friends and Family: Not on file    Attends Confucianism Services: Not on file    Active Member of Clubs or Organizations: Not on file    Attends Club or Organization Meetings: Not on file    Marital Status: Not on file   Intimate Partner Violence:     Fear of Current or Ex-Partner: Not on file    Emotionally Abused: Not on file    Physically Abused: Not on file    Sexually Abused: Not on file   Housing Stability:     Unable to Pay for Housing in the Last Year: Not on file    Number of Places Lived in the Last Year: Not on file    Unstable Housing in the Last Year: Not on file       Allergies: No Known Allergies    Current Medications:  Current Facility-Administered Medications   Medication Dose Route Frequency    heparin 25,000 units in  ml infusion  13-36 Units/kg/hr IntraVENous TITRATE    sodium chloride (NS) flush 5-40 mL  5-40 mL IntraVENous Q8H    sodium chloride (NS) flush 5-40 mL  5-40 mL IntraVENous PRN    acetaminophen (TYLENOL) tablet 650 mg  650 mg Oral Q6H PRN    Or    acetaminophen (TYLENOL) suppository 650 mg  650 mg Rectal Q6H PRN    polyethylene glycol (MIRALAX) packet 17 g  17 g Oral DAILY PRN    ondansetron (ZOFRAN ODT) tablet 4 mg  4 mg Oral Q8H PRN    Or    ondansetron (ZOFRAN) injection 4 mg  4 mg IntraVENous Q6H PRN    0.9% sodium chloride infusion  25 mL/hr IntraVENous RAD CONTINUOUS    fentaNYL citrate (PF) injection  mcg   mcg IntraVENous Rad Multiple    flumazeniL (ROMAZICON) 0.1 mg/mL injection 0.5 mg  0.5 mg IntraVENous RAD PRN    midazolam (VERSED) injection 0.5-5 mg  0.5-5 mg IntraVENous Rad Multiple    naloxone (NARCAN) injection 0.4 mg  0.4 mg IntraVENous RAD PRN    lidocaine (XYLOCAINE) 20 mg/mL (2 %) injection 400 mg  20 mL SubCUTAneous ONCE    iopamidoL (ISOVUE 300) 61 % contrast injection 300 mL  300 mL IntraCATHeter RAD ONCE    heparin 4000 units/1000 mL (4 units/mL) in NS infusion **FOR ANGIO USE ONLY**   Irrigation RAD PRN    lidocaine (XYLOCAINE) 20 mg/mL (2 %) injection            Physical Exam:  Blood pressure (!) 141/100, pulse (!) 106, temperature 99.2 °F (37.3 °C), resp. rate (!) 31, weight 114.6 kg (252 lb 9.6 oz), SpO2 93 %.   GENERAL: alert, cooperative, no distress, appears stated age,   LUNG: Nonlabored respiration on 3L O2  HEART: sinus tachycardia    Alerts:      Laboratory:      Recent Labs     02/20/22  1351   HGB 16.4   HCT 50.7*   WBC 7.0   *   BUN 15   CREA 1.28   K 3.6         Plan of Care/Planned Procedure:  Risks, benefits, and alternatives reviewed with patient and he agrees to proceed with the procedure. Pulmonary angiogram with thrombectomy    Deemed appropriate for moderate sedation with versed and fentanyl.     Henrik Foster MD  Interventional Radiology  [facility]  7:24 PM, 2/20/2022

## 2022-02-21 NOTE — PROGRESS NOTES
1930: Bedside and Verbal shift change report given to Javier Franco RN (oncoming nurse) by Brandon Tarango RN (offgoing nurse). Report included the following information SBAR, Kardex, ED Summary, Intake/Output, MAR, Accordion, Recent Results, Med Rec Status, Cardiac Rhythm Sinus Tachycardia and Alarm Parameters . 2000: Pt off the floor for procedure. 2104: TRANSFER - IN REPORT:    Verbal report received from CAROL Mcclain(name) on Elvira Curd  being received from Angio(unit) for routine progression of care      Report consisted of patients Situation, Background, Assessment and   Recommendations(SBAR). Information from the following report(s) SBAR, Procedure Summary, Intake/Output, MAR and Recent Results was reviewed with the receiving nurse. Opportunity for questions and clarification was provided. Assessment completed upon patients arrival to unit and care assumed. 5: RN spoke with Gloria Mc in Pharmacy in regards to Heparin gtt. Per pharmacy, RN to restart Heparin gtt @10units/kg/hr and redraw aPTT in Q6H.     0730: Bedside and Verbal shift change report given to Maia Henry RN (oncoming nurse) by Javier Franco RN (offgoing nurse). Report included the following information SBAR, Kardex, ED Summary, Procedure Summary, Intake/Output, MAR, Accordion, Recent Results, Med Rec Status, Cardiac Rhythm NSR, Alarm Parameters  and Dual Neuro Assessment.

## 2022-02-21 NOTE — PROGRESS NOTES
Interventional Radiology Progress Note    2/21/2022    Subjective:    History: 59 yo male with pulmonary embolism s/p catheter-directed thrombectomy on 2/20/22. Today: Pt reports he feels much better. Some mild discomfort in his back persists; pt reports he has been walking in the unit and tolerating diet. R groin site without pain or bleeding. Objective:    Laboratory:    Recent Labs     02/21/22  0237 02/20/22  2224 02/20/22  1351   HGB 14.0  --    < >   HCT 43.3  --    < >   WBC 8.2  --    < >   *  --    < >   INR  --  1.3*  --    BUN 14  --    < >   CREA 0.97  --    < >   K 3.8  --    < >    < > = values in this interval not displayed. Imaging:  CTA CHEST W OR W WO CONT    Result Date: 2/20/2022  Bilateral pulmonary emboli, saddle embolus and right heart strain. The results were called and verbally communicated to Dr. Letcher Sever on 2/20/2022 at 3:50 PM. Right upper lobe opacity may represent pulmonary infarct or other. Please see full report. XR CHEST PORT    Result Date: 2/20/2022  No acute process       Physical Exam:  Vital Signs Blood pressure 119/77, pulse 70, temperature 98.7 °F (37.1 °C), resp. rate 23, height 6' (1.829 m), weight 114 kg (251 lb 5.2 oz), SpO2 92 %. GENERAL: alert, cooperative, no distress, appears stated age,   HEART/LUNG: no cardiopulmonary distress,   EXTREMITIES:  extremities normal, atraumatic, no cyanosis or edema; R groin dressing C/D/I without bleeding or hematoma. Distal pulses intact. NEUROLOGIC: AOx3. Cranial nerves 2-12 and sensation grossly intact. Assessment/Plan:   59 yo male with PE 1 day s/p catheter directed thrombectomy in IR. Pt doing much better. Plan per primary team. IR remains available prn. Transition to systemic anticoag per heme-onc/primary team.     Please note that case was discussed with Dr. Bahman Dugan, who participated in the provision of these services.        Niko Lund PA-C  Interventional Radiology  [facility] [place]  [facility]  [phone]  [facility] [phone]

## 2022-02-21 NOTE — PROGRESS NOTES
[facility]    ICU TEAM Progress Note    Name: Tu Marie   : 1965   MRN: [id number]   Date: 2022           ICU Assessment     Saddle pulmonary embolism           ICU Comprehensive Plan of Care: This is a 58-year-old male with no past medical history who presented to outside hospital on  with complaints of shortness of breath and right lateral rib pain for the last 2 to 3 days. Reportedly, the patient experienced some left calf pain/spasm about 3 days ago. Subsequently he started experiencing shortness of breath with right-sided rib pain and episodic diaphoresis. Patient went to urgent care and was given doxycycline for presumed pneumonia. However given nonresolving symptoms he again seeked medical attention in the ER on  where CT scan revealed bilateral PE (saddle) with associated right heart strain. Labs also showed elevated troponin and proBNP; EKG revealed classic S1Q3T3 pattern. Patient was started on heparin infusion and transfer to [facility] ICU was arranged for possible IR guided intervention. The patient underwent IR guided percutaneous embolectomy on  and appears to be doing well. His symptoms have mostly resolved. Impression: Saddle pulmonary embolism status post percutaneous thrombectomy.    -Continue heparin infusion targeting therapeutic PTT between 50 and 70 for the next 48 hours as per IR. Likely transition to NOAC after 48 hours.    -Follow-up TTE, lower extremity Dopplers.    -Follow-up partial hypercoagulable work-up. -Out of bed to chair, regular diet.    -Continue ICU care.     Subjective:   Progress Note: 2022      Reason for ICU Admission: Saddle PE    HPI:    Overnight Events:   2022      POD:  * No surgery found *    S/P:       Active Problem List:     Problem List  Never Reviewed          Codes Class    Pulmonary embolism ([facility] [address] ICD-10-CM: I26.99  ICD-9-CM: 415.19               Past Medical History:      has no past medical history on file. Past Surgical History:      has no past surgical history on file. Home Medications:     Prior to Admission medications    Not on File       Allergies/Social/Family History:     No Known Allergies   Social History     Tobacco Use    Smoking status: Not on file    Smokeless tobacco: Not on file   Substance Use Topics    Alcohol use: Not on file      No family history on file. Review of Systems:     A comprehensive review of systems was negative except for that written in the HPI.     Objective:   Vital Signs:  Visit Vitals  /77 (BP 1 Location: Left upper arm, BP Patient Position: At rest)   Pulse 68   Temp 98.7 °F (37.1 °C)   Resp 22   Ht 6' (1.829 m)   Wt 114 kg (251 lb 5.2 oz)   SpO2 95%   BMI 34.09 kg/m²    O2 Flow Rate (L/min): 2 l/min (weaned from 3L) O2 Device: None (Room air) Temp (24hrs), Av.4 °F (36.9 °C), Min:97.9 °F (36.6 °C), Max:99.2 °F (37.3 °C)           Intake/Output:     Intake/Output Summary (Last 24 hours) at 2022 1206  Last data filed at 2022 1200  Gross per 24 hour   Intake 2247.07 ml   Output 1200 ml   Net 1047.07 ml       Physical Exam:    General appearance: alert, cooperative, no distress, appears stated age  Lungs: clear to auscultation bilaterally  Heart: regular rate and rhythm, S1, S2 normal, no murmur, click, rub or gallop  Extremities: extremities normal, atraumatic, no cyanosis or edema      LABS AND  DATA: Personally reviewed  Recent Labs     22  02322  1351   WBC 8.2 7.0   HGB 14.0 16.4   HCT 43.3 50.7*   * 149*     Recent Labs     22  0237 22  1351    137   K 3.8 3.6    107   CO2 25 27   BUN 14 15   CREA 0.97 1.28   * 116*   CA 8.6 9.6     Recent Labs     22  1351   AP 52   TP 7.8   ALB 3.6   GLOB 4.2*     Recent Labs     22  0524 22  2224   INR  --  1.3*   PTP  --  13.4*   APTT 40.4* >130.0*      No results for input(s): PHI, PCO2I, PO2I, FIO2I in the last 72 hours. No results for input(s): CPK, CKMB, TROIQ, BNPP in the last 72 hours. Hemodynamics:   PAP:   CO:     Wedge:   CI:     CVP:    SVR:       PVR:       Ventilator Settings:  Mode Rate Tidal Volume Pressure FiO2 PEEP                    Peak airway pressure:      Minute ventilation:          MEDS: Reviewed    Chest X-Ray:  CXR Results  (Last 48 hours)               02/20/22 1437  XR CHEST PORT Final result    Impression:  No acute process                                   Narrative:  XR CHEST PORT       Comparison: None. The lungs are clear without infiltrate or pleural effusion. The heart and   mediastinum are unremarkable for technique. The pulmonary vascularity is normal.   The bony thorax is unremarkable. ECHO:  Pending    Multidisciplinary Rounds Completed: Yes    ABCDEF Bundle/Checklist Completed:  Yes    SPECIAL EQUIPMENT  None    DISPOSITION  Stay in ICU    CRITICAL CARE CONSULTANT NOTE  I had a face to face encounter with the patient, reviewed and interpreted patient data including clinical events, labs, images, vital signs, I/O's, and examined patient. I have discussed the case and the plan and management of the patient's care with the consulting services, the bedside nurses and the respiratory therapist.      NOTE OF PERSONAL INVOLVEMENT IN CARE   This patient has a high probability of imminent, clinically significant deterioration, which requires the highest level of preparedness to intervene urgently. I participated in the decision-making and personally managed or directed the management of the following life and organ supporting interventions that required my frequent assessment to treat or prevent imminent deterioration. I personally spent 30 minutes of critical care time. This is time spent at this critically ill patient's bedside actively involved in patient care as well as the coordination of care.   This does not include any procedural time which has been billed separately.     Nick Nguyen,   Staff Intensivist/Anesthesiologist  [facility]  2/21/2022

## 2022-02-21 NOTE — PROCEDURES
Interventional Radiology  Procedure Note        2/20/2022 8:56 PM    Patient: Celso Gross     Informed consent obtained    Diagnosis: Pulmonary embolism with right heart strain    Procedure(s):   - Bilateral pulmonary angiogram showing extensive bilateral thrombus with overall poor perfusion of all lobes  - Pre-thrombectomy PA pressure 61/20, MAP 18  - RLL, RUL, and LLL aspiration thrombectomy obtaining a large amount of acute appearing thrombus  - Post thrombectomy angiogram showing excellent perfusion of the left lung and RLL; still somewhat diminished perfusion of the RUL. Suspect some remnant thrombus in the RUL, and a component of infarct already occurred. - Post-thrombectomy PA pressure 21/17, MAP 18  - Pursestring suture closure of right femoral vein    Specimens removed:  Large amount of acute thrombus    Complications: None    Primary Physician: Ofelia Samaniego MD    Recommendations: Continue therapeutic heparin; ok to remove right groin suture after 2 days. Swelling and at least small hematoma is expected at R groin.     Discharge Disposition: return to ICU for continued monitoring    Full dictated report to follow    Ofelia Samaniego MD  Interventional Radiology  [facility], [facility].  8:56 PM, 2/20/2022

## 2022-02-21 NOTE — PROGRESS NOTES
Bedside shift change report given to Stalin Israel RN (oncoming nurse) by Pedro Marie RN (offgoing nurse). Report included the following information SBAR, Kardex, ED Summary, Procedure Summary, Intake/Output, MAR, Recent Results, Cardiac Rhythm NSR, Alarm Parameters , Quality Measures and Dual Neuro Assessment. Heparin gtt dual verified at handoff. 1115: Pt ambulated 2 laps around the unit, no assistance needed. Pt did not feel short of breath and felt at baseline when ambulating. 1400: Pt ambulated 2 more laps without difficulty. 1718: Dr. Mendel Comber notified of duplex results. 1920: Pt ambulated 2 more laps without difficulty. Bedside shift change report given to Pedro Marie RN (oncoming nurse) by Stalin Israel RN (offgoing nurse). Report included the following information SBAR, Kardex, ED Summary, Procedure Summary, Intake/Output, Recent Results, Cardiac Rhythm NSR, Alarm Parameters , Quality Measures and Dual Neuro Assessment.

## 2022-02-22 LAB
ANION GAP SERPL CALC-SCNC: 3 MMOL/L (ref 5–15)
APTT PPP: 37.6 SEC (ref 22.1–31)
APTT PPP: 46.4 SEC (ref 22.1–31)
APTT PPP: 55 SEC (ref 22.1–31)
BASOPHILS # BLD: 0 K/UL (ref 0–0.1)
BASOPHILS NFR BLD: 0 % (ref 0–1)
BUN SERPL-MCNC: 15 MG/DL (ref 6–20)
BUN/CREAT SERPL: 15 (ref 12–20)
CALCIUM SERPL-MCNC: 9.1 MG/DL (ref 8.5–10.1)
CHLORIDE SERPL-SCNC: 108 MMOL/L (ref 97–108)
CO2 SERPL-SCNC: 25 MMOL/L (ref 21–32)
CREAT SERPL-MCNC: 0.97 MG/DL (ref 0.7–1.3)
DIFFERENTIAL METHOD BLD: ABNORMAL
EOSINOPHIL # BLD: 0.2 K/UL (ref 0–0.4)
EOSINOPHIL NFR BLD: 3 % (ref 0–7)
ERYTHROCYTE [DISTWIDTH] IN BLOOD BY AUTOMATED COUNT: 13.6 % (ref 11.5–14.5)
GLUCOSE SERPL-MCNC: 96 MG/DL (ref 65–100)
HCT VFR BLD AUTO: 41.5 % (ref 36.6–50.3)
HGB BLD-MCNC: 13.6 G/DL (ref 12.1–17)
IMM GRANULOCYTES # BLD AUTO: 0 K/UL (ref 0–0.04)
IMM GRANULOCYTES NFR BLD AUTO: 1 % (ref 0–0.5)
LYMPHOCYTES # BLD: 2.8 K/UL (ref 0.8–3.5)
LYMPHOCYTES NFR BLD: 44 % (ref 12–49)
MAGNESIUM SERPL-MCNC: 2 MG/DL (ref 1.6–2.4)
MCH RBC QN AUTO: 27.5 PG (ref 26–34)
MCHC RBC AUTO-ENTMCNC: 32.8 G/DL (ref 30–36.5)
MCV RBC AUTO: 84 FL (ref 80–99)
MONOCYTES # BLD: 0.7 K/UL (ref 0–1)
MONOCYTES NFR BLD: 11 % (ref 5–13)
NEUTS SEG # BLD: 2.6 K/UL (ref 1.8–8)
NEUTS SEG NFR BLD: 41 % (ref 32–75)
NRBC # BLD: 0 K/UL (ref 0–0.01)
NRBC BLD-RTO: 0 PER 100 WBC
PHOSPHATE SERPL-MCNC: 3.1 MG/DL (ref 2.6–4.7)
PLATELET # BLD AUTO: 134 K/UL (ref 150–400)
PMV BLD AUTO: 11.5 FL (ref 8.9–12.9)
POTASSIUM SERPL-SCNC: 3.7 MMOL/L (ref 3.5–5.1)
RBC # BLD AUTO: 4.94 M/UL (ref 4.1–5.7)
SODIUM SERPL-SCNC: 136 MMOL/L (ref 136–145)
THERAPEUTIC RANGE,PTTT: ABNORMAL SECS (ref 58–77)
WBC # BLD AUTO: 6.3 K/UL (ref 4.1–11.1)

## 2022-02-22 PROCEDURE — 74011250636 HC RX REV CODE- 250/636: Performed by: INTERNAL MEDICINE

## 2022-02-22 PROCEDURE — 36415 COLL VENOUS BLD VENIPUNCTURE: CPT

## 2022-02-22 PROCEDURE — 74011250636 HC RX REV CODE- 250/636: Performed by: FAMILY MEDICINE

## 2022-02-22 PROCEDURE — 85730 THROMBOPLASTIN TIME PARTIAL: CPT

## 2022-02-22 PROCEDURE — 74011250636 HC RX REV CODE- 250/636: Performed by: NURSE PRACTITIONER

## 2022-02-22 PROCEDURE — 65660000000 HC RM CCU STEPDOWN

## 2022-02-22 PROCEDURE — 74011250637 HC RX REV CODE- 250/637: Performed by: INTERNAL MEDICINE

## 2022-02-22 PROCEDURE — 85025 COMPLETE CBC W/AUTO DIFF WBC: CPT

## 2022-02-22 PROCEDURE — 84100 ASSAY OF PHOSPHORUS: CPT

## 2022-02-22 PROCEDURE — 80048 BASIC METABOLIC PNL TOTAL CA: CPT

## 2022-02-22 PROCEDURE — 74011000250 HC RX REV CODE- 250: Performed by: INTERNAL MEDICINE

## 2022-02-22 PROCEDURE — 83735 ASSAY OF MAGNESIUM: CPT

## 2022-02-22 RX ORDER — HEPARIN SODIUM 1000 [USP'U]/ML
40 INJECTION, SOLUTION INTRAVENOUS; SUBCUTANEOUS ONCE
Status: COMPLETED | OUTPATIENT
Start: 2022-02-22 | End: 2022-02-22

## 2022-02-22 RX ORDER — HEPARIN SODIUM 1000 [USP'U]/ML
80 INJECTION, SOLUTION INTRAVENOUS; SUBCUTANEOUS ONCE
Status: COMPLETED | OUTPATIENT
Start: 2022-02-22 | End: 2022-02-22

## 2022-02-22 RX ADMIN — ACETAMINOPHEN 650 MG: 325 TABLET ORAL at 21:07

## 2022-02-22 RX ADMIN — Medication 17 UNITS/KG/HR: at 07:38

## 2022-02-22 RX ADMIN — ACETAMINOPHEN 650 MG: 325 TABLET ORAL at 02:25

## 2022-02-22 RX ADMIN — Medication 22 UNITS/KG/HR: at 21:00

## 2022-02-22 RX ADMIN — SODIUM CHLORIDE, PRESERVATIVE FREE 10 ML: 5 INJECTION INTRAVENOUS at 21:08

## 2022-02-22 RX ADMIN — SODIUM CHLORIDE, PRESERVATIVE FREE 10 ML: 5 INJECTION INTRAVENOUS at 06:03

## 2022-02-22 RX ADMIN — HEPARIN SODIUM 4560 UNITS: 1000 INJECTION INTRAVENOUS; SUBCUTANEOUS at 05:59

## 2022-02-22 RX ADMIN — HEPARIN SODIUM 9120 UNITS: 1000 INJECTION INTRAVENOUS; SUBCUTANEOUS at 13:13

## 2022-02-22 RX ADMIN — SODIUM CHLORIDE, PRESERVATIVE FREE 10 ML: 5 INJECTION INTRAVENOUS at 13:13

## 2022-02-22 NOTE — PROGRESS NOTES
TRANSFER - IN REPORT:    Verbal report received from [facility] RN (name) on Tu Marie  being received from ICU (unit) for routine progression of care      Report consisted of patients Situation, Background, Assessment and   Recommendations(SBAR). Information from the following report(s) SBAR, Kardex, MAR, Recent Results and Cardiac Rhythm NSR was reviewed with the receiving nurse. Opportunity for questions and clarification was provided. Assessment completed upon patients arrival to unit and care assumed.

## 2022-02-22 NOTE — PROGRESS NOTES
Problem: Pressure Injury - Risk of  Goal: *Prevention of pressure injury  Description: Document Dante Scale and appropriate interventions in the flowsheet. Outcome: Progressing Towards Goal  Note: Pressure Injury Interventions: Activity Interventions: Increase time out of bed,PT/OT evaluation    Mobility Interventions: Float heels,HOB 30 degrees or less    Nutrition Interventions: Document food/fluid/supplement intake                     Problem: Falls - Risk of  Goal: *Absence of Falls  Description: Document Shila Fall Risk and appropriate interventions in the flowsheet.   Outcome: Progressing Towards Goal  Note: Fall Risk Interventions:  Mobility Interventions: Bed/chair exit alarm,Patient to call before getting OOB         Medication Interventions: Assess postural VS orthostatic hypotension    Elimination Interventions: Call light in reach

## 2022-02-22 NOTE — PROGRESS NOTES
[facility] Adult  Hospitalist Group     ICU Transfer/Accept Summary     This [place] is being [place] [address] [place]  DATE OF TRANSFER: 2/22/2022       PATIENT ID: Fidencio Jaquez  MRN: [id number]   YOB: 1965    PRIMARY CARE PROVIDER: None   DATE OF ADMISSION: 2/20/2022  5:29 PM    ATTENDING PHYSICIAN: Andrey Colon NP  CONSULTATIONS:   IP CONSULT TO INTENSIVIST    PROCEDURES/SURGERIES:   * No surgery found *    REASON FOR ADMISSION: <principal problem not specified>     HOSPITAL PROBLEM LIST:  Patient Active Problem List   Diagnosis Code    Pulmonary embolism (HCC) I26.99         Brief HPI and Hospital Course:    Per ICU   This is a 47-year-old male with no past medical history who presented to outside hospital on 2/20 with complaints of shortness of breath and right lateral rib pain for the last 2 to 3 days.  Reportedly, the patient experienced some left calf pain/spasm about 3 days ago.  Subsequently he started experiencing shortness of breath with right-sided rib pain and episodic diaphoresis.  Patient went to urgent care and was given doxycycline for presumed pneumonia.  However given nonresolving symptoms he again seeked medical attention in the ER on 2/20 where CT scan revealed bilateral PE (saddle) with associated right heart strain.  Labs also showed elevated troponin and proBNP; EKG revealed classic S1Q3T3 pattern.  Patient was started on heparin infusion and transfer to [facility] ICU was arranged for possible IR guided intervention. The patient underwent IR guided percutaneous embolectomy on 2/20 and appears to be doing well. His symptoms have mostly resolved. Hospitalist note:   02/22: Seen and examined patient sitting up in bed. States that he is feeling good. No overnight events. Chart reviewed. Patient to transfer out of the ICU today.    Assessment and Plan:      Saddle PE  DVT LLE  - s/p percutaneous thrombectomy  - Continue heparin gtts and likely transition in the next 48 hours to NOAC  - Duplex positive for left popliteal vein and in the peroneal vein. Obesity   - BMI 34  - Weight loss management to be followed up outpatient. PHYSICAL EXAMINATION:  Visit Vitals  /88   Pulse 66   Temp 98.6 °F (37 °C)   Resp 19   Ht 6' (1.829 m)   Wt 114 kg (251 lb 5.2 oz)   SpO2 96%   BMI 34.09 kg/m²       General:          Alert, cooperative, no distress  HEENT:           Atraumatic, MMM            Neck:               Supple, symmetrical,  thyroid: non tender  Lungs:             Clear to auscultation bilaterally. No Wheezing or Rhonchi. No rales. Heart:              Regular  rhythm,  No  murmur   No edema  Abdomen:       Soft, non-tender. Not distended. Bowel sounds normal  Extremities:     No cyanosis. No clubbing,  +2 distal pulses  Skin:                Not pale. Not Jaundiced  No rashes   Psych:             Not anxious or agitated.   Neurologic:      Alert, moves all extremities, oriented X 3.     CODE STATUS:  X Full Code    DNR    Partial    Comfort Care     Signed:   Asha Leggett NP  Date of Service:  2/22/2022  8:28 AM

## 2022-02-22 NOTE — PROGRESS NOTES
.Reviewed chart for transitions of care, and discussed in rounds. CM met with patient and his wife Hiwot Lima [phone]  at bedside to explain role and offer support. Patient is alert and oriented x4, and confirmed demographics. Baseline:   ADLs/IDALS: Independent  Previous Home Health:None  Previous SNF/IPR:None  ER Contact: Wife Hiwot Lima [phone]  Patient lives in a 2 level house with 6-8 steps to enter. Patient is independent with ADLs/IDALs    Patient uses a cane on occasion to ambulate. Patient's preferred pharmacy is the [address] [place] [address]. Patient's Family is expected to transport at discharge. Reason for Admission:  Saddle pulmonary embolism                        RUR Score:    6 %                 Plan for utilizing home health:   TBD       PCP: First and Last name:  Patient seen at the [facility]     Name of Practice: [address] [place] [address]   Are you a current patient: Yes/No: Yes   Approximate date of last visit:    Can you participate in a virtual visit with your PCP: Yes                    Current Advanced Directive/Advance Care Plan: Full Code      Healthcare Decision Maker:   Click here to complete [employer] including selection of the Healthcare Decision Maker Relationship (ie \"Primary\")                             Transition of Care Plan:             Patient presented to the ED at [facility] from home with shortness of breath and right lateral rib pain. CT scan revealed bilateral PE (saddle) with associated right heart strain    Care manager met with patient to introduce self and explain role. Patient lives independently with his wife Hiwot Lima. Care manger confirmed demographic information. CM offered patient the option of transferring to the [facility] once medically stable , he declined and will use his insurance through [facility]. Refusal of transfer form faxed to the [place] Transfer center.    León Alvarez RN,Care Management  Care Management Interventions  PCP Verified by CM: Yes (Seen at the [facility] )  Luishart Signup: No  Discharge Durable Medical Equipment: No  Physical Therapy Consult: No  Occupational Therapy Consult: No  Speech Therapy Consult: No  Support Systems: Spouse/Significant Other (wife Kareem Arnold [phone])  Discharge Location  Patient Expects to be Discharged to[de-identified] Home with family assistance

## 2022-02-22 NOTE — PROGRESS NOTES
1930:Bedside and Verbal shift change report given to TOMI Cruz, CAROL (oncoming nurse) by Rosaura Curtis (offgoing nurse). Report included the following information SBAR, Kardex, Intake/Output, MAR, Accordion, Recent Results, Med Rec Status, Cardiac Rhythm NSR, Alarm Parameters  and Dual Neuro Assessment. 0730: Bedside and Verbal shift change report given to NANCIE Anand RN (oncoming nurse) by TOMI Cruz RN (offgoing nurse). Report included the following information SBAR, Kardex, ED Summary, Procedure Summary, Intake/Output, MAR, Accordion, Recent Results, Med Rec Status, Cardiac Rhythm NSR, Alarm Parameters  and Dual Neuro Assessment.

## 2022-02-22 NOTE — PROGRESS NOTES
[facility]    ICU TEAM Progress Note    Name: Priscilla Moore   : 1965   MRN: [id number]   Date: 2022           ICU Assessment     Saddle Pulmonary Embolism  S/p IR guided thrombectomy (22)  RV Strain (see results below)  Troponin Leak  LLE DVT  Obesity    ECHO - 22      Left Ventricle: Left ventricle size is normal. Normal wall thickness. Normal wall motion. Normal left ventricular systolic function with a visually estimated EF of 55 - 60%. Normal diastolic function.   Right Ventricle: Right ventricle is mildly dilated. Global hypokinesis and apical sparing present. Mildly reduced systolic function. ICU Comprehensive Plan of Care: This is a 49-year-old male with no past medical history who presented to outside hospital on  with complaints of shortness of breath and right lateral rib pain for the last 2 to 3 days. Reportedly, the patient experienced some left calf pain/spasm about 3 days ago. Subsequently he started experiencing shortness of breath with right-sided rib pain and episodic diaphoresis. Patient went to urgent care and was given doxycycline for presumed pneumonia. However given nonresolving symptoms he again seeked medical attention in the ER on  where CT scan revealed bilateral PE (saddle) with associated right heart strain. Labs also showed elevated troponin and proBNP; EKG revealed classic S1Q3T3 pattern. Patient was started on heparin infusion and transfer to [facility] ICU was arranged for possible IR guided intervention. The patient underwent IR guided percutaneous embolectomy on  and appears to be doing well. His symptoms have mostly resolved. Impression: Saddle pulmonary embolism status post percutaneous thrombectomy.    -Continue heparin infusion targeting therapeutic PTT between 50 and 70 for the next 48 hours as per IR. Likely transition to NOAC after 48 hours.    -Follow-up partial hypercoagulable work-up.     -Out of bed to chair, regular diet. Subjective:   Progress Note: 2022      Reason for ICU Admission: Saddle PE    HPI:    Overnight Events:    -- No acute events    Active Problem List:     Problem List  Never Reviewed          Codes Class    Pulmonary embolism ([facility] [address] ICD-10-CM: I26.99  ICD-9-CM: 415.19               Past Medical History:      has no past medical history on file. Past Surgical History:      has a past surgical history that includes ir thrombectomy [facility] art primary non gissel or intracranial (2022). Home Medications:     Prior to Admission medications    Not on File       Allergies/Social/Family History:     No Known Allergies   Social History     Tobacco Use    Smoking status: Not on file    Smokeless tobacco: Not on file   Substance Use Topics    Alcohol use: Not on file      No family history on file. Review of Systems:     A comprehensive review of systems was negative except for that written in the HPI.     Objective:   Vital Signs:  Visit Vitals  /80 (BP 1 Location: Left upper arm, BP Patient Position: At rest)   Pulse (!) 58   Temp 98.5 °F (36.9 °C)   Resp 15   Ht 6' (1.829 m)   Wt 114 kg (251 lb 5.2 oz)   SpO2 (!) 86%   BMI 34.09 kg/m²    O2 Flow Rate (L/min): 2 l/min (weaned from 3L) O2 Device: None (Room air) Temp (24hrs), Av.3 °F (36.8 °C), Min:97.9 °F (36.6 °C), Max:98.8 °F (37.1 °C)           Intake/Output:     Intake/Output Summary (Last 24 hours) at 2022 6612  Last data filed at 2022 0400  Gross per 24 hour   Intake 984.27 ml   Output 1200 ml   Net -215.73 ml       Physical Exam:    General appearance: alert, cooperative, no distress, appears stated age  Lungs: clear to auscultation bilaterally  Heart: regular rate and rhythm, S1, S2 normal, no murmur, click, rub or gallop  Extremities: extremities normal, atraumatic, no cyanosis or edema      LABS AND  DATA: Personally reviewed  Recent Labs     22  0420 22  0237   WBC 6.3 8.2   HGB 13.6 14.0   HCT 41.5 43.3   * 127*     Recent Labs     02/22/22  0420 02/21/22  0237    136   K 3.7 3.8    108   CO2 25 25   BUN 15 14   CREA 0.97 0.97   GLU 96 120*   CA 9.1 8.6   MG 2.0  --    PHOS 3.1  --      Recent Labs     02/20/22  1351   AP 52   TP 7.8   ALB 3.6   GLOB 4.2*     Recent Labs     02/22/22  0420 02/21/22  2114 02/21/22  0524 02/20/22  2224   INR  --   --   --  1.3*   PTP  --   --   --  13.4*   APTT 46.4* 52.2*   < > >130.0*    < > = values in this interval not displayed. No results for input(s): PHI, PCO2I, PO2I, FIO2I in the last 72 hours. No results for input(s): CPK, CKMB, TROIQ, BNPP in the last 72 hours. Hemodynamics:   PAP:   CO:     Wedge:   CI:     CVP:    SVR:       PVR:       Ventilator Settings:  Mode Rate Tidal Volume Pressure FiO2 PEEP                    Peak airway pressure:      Minute ventilation:          MEDS: Reviewed    Chest X-Ray:  CXR Results  (Last 48 hours)               02/20/22 1437  XR CHEST PORT Final result    Impression:  No acute process                                   Narrative:  XR CHEST PORT       Comparison: None. The lungs are clear without infiltrate or pleural effusion. The heart and   mediastinum are unremarkable for technique. The pulmonary vascularity is normal.   The bony thorax is unremarkable. ECHO:      Left Ventricle: Left ventricle size is normal. Normal wall thickness. Normal wall motion. Normal left ventricular systolic function with a visually estimated EF of 55 - 60%. Normal diastolic function.   Right Ventricle: Right ventricle is mildly dilated. Global hypokinesis and apical sparing present. Mildly reduced systolic function. Multidisciplinary Rounds Completed:   Yes    ABCDEF Bundle/Checklist Completed:  Yes    SPECIAL EQUIPMENT  None    DISPOSITION  Transfer to non-ICU bed    CRITICAL CARE CONSULTANT NOTE  I had a face to face encounter with the patient, reviewed and interpreted patient data including clinical events, labs, images, vital signs, I/O's, and examined patient. I have discussed the case and the plan and management of the patient's care with the consulting services, the bedside nurses and the respiratory therapist.      NOTE OF PERSONAL INVOLVEMENT IN CARE   This patient has a high probability of imminent, clinically significant deterioration, which requires the highest level of preparedness to intervene urgently. I participated in the decision-making and personally managed or directed the management of the following life and organ supporting interventions that required my frequent assessment to treat or prevent imminent deterioration.     V3    Racquel Coombs,    Staff Intensivist/Anesthesiologist  Critical Care Medicine

## 2022-02-23 LAB
ANION GAP SERPL CALC-SCNC: 5 MMOL/L (ref 5–15)
APTT PPP: 39 SEC (ref 22.1–31)
APTT PPP: 54.1 SEC (ref 22.1–31)
APTT PPP: 91.9 SEC (ref 22.1–31)
APTT PPP: >130 SEC (ref 22.1–31)
B2 GLYCOPROT1 IGA SER-ACNC: <9 GPI IGA UNITS (ref 0–25)
B2 GLYCOPROT1 IGG SER-ACNC: <9 GPI IGG UNITS (ref 0–20)
B2 GLYCOPROT1 IGM SER-ACNC: <9 GPI IGM UNITS (ref 0–32)
BASOPHILS # BLD: 0 K/UL (ref 0–0.1)
BASOPHILS NFR BLD: 1 % (ref 0–1)
BUN SERPL-MCNC: 14 MG/DL (ref 6–20)
BUN/CREAT SERPL: 12 (ref 12–20)
CALCIUM SERPL-MCNC: 9.2 MG/DL (ref 8.5–10.1)
CARDIOLIPIN IGA SER IA-ACNC: <9 APL U/ML (ref 0–11)
CARDIOLIPIN IGG SER IA-ACNC: <9 GPL U/ML (ref 0–14)
CARDIOLIPIN IGM SER IA-ACNC: <9 MPL U/ML (ref 0–12)
CHLORIDE SERPL-SCNC: 108 MMOL/L (ref 97–108)
CO2 SERPL-SCNC: 24 MMOL/L (ref 21–32)
CREAT SERPL-MCNC: 1.14 MG/DL (ref 0.7–1.3)
DIFFERENTIAL METHOD BLD: NORMAL
EOSINOPHIL # BLD: 0.2 K/UL (ref 0–0.4)
EOSINOPHIL NFR BLD: 3 % (ref 0–7)
ERYTHROCYTE [DISTWIDTH] IN BLOOD BY AUTOMATED COUNT: 13.5 % (ref 11.5–14.5)
GLUCOSE SERPL-MCNC: 95 MG/DL (ref 65–100)
HCT VFR BLD AUTO: 44 % (ref 36.6–50.3)
HEXAGONAL PHASE PHOSPHOLIPID, 117839: 4 SEC (ref 0–11)
HGB BLD-MCNC: 14.4 G/DL (ref 12.1–17)
IMM GRANULOCYTES # BLD AUTO: 0 K/UL (ref 0–0.04)
IMM GRANULOCYTES NFR BLD AUTO: 0 % (ref 0–0.5)
INTERPRETATION, 117893: ABNORMAL
LYMPHOCYTES # BLD: 3.2 K/UL (ref 0.8–3.5)
LYMPHOCYTES NFR BLD: 46 % (ref 12–49)
MAGNESIUM SERPL-MCNC: 2.3 MG/DL (ref 1.6–2.4)
MCH RBC QN AUTO: 27.6 PG (ref 26–34)
MCHC RBC AUTO-ENTMCNC: 32.7 G/DL (ref 30–36.5)
MCV RBC AUTO: 84.5 FL (ref 80–99)
MONOCYTES # BLD: 0.7 K/UL (ref 0–1)
MONOCYTES NFR BLD: 10 % (ref 5–13)
NEUTS SEG # BLD: 2.7 K/UL (ref 1.8–8)
NEUTS SEG NFR BLD: 40 % (ref 32–75)
NRBC # BLD: 0 K/UL (ref 0–0.01)
NRBC BLD-RTO: 0 PER 100 WBC
PHOSPHATE SERPL-MCNC: 4.2 MG/DL (ref 2.6–4.7)
PLATELET # BLD AUTO: 162 K/UL (ref 150–400)
PMV BLD AUTO: 11.9 FL (ref 8.9–12.9)
POTASSIUM SERPL-SCNC: 3.9 MMOL/L (ref 3.5–5.1)
PTT-LA MIX, LUPR1T: 49.9 SEC (ref 0–48.9)
RBC # BLD AUTO: 5.21 M/UL (ref 4.1–5.7)
SCREEN APTT: 54.1 SEC (ref 0–51.9)
SCREEN DRVVT: 46 SEC (ref 0–47)
SODIUM SERPL-SCNC: 137 MMOL/L (ref 136–145)
THERAPEUTIC RANGE,PTTT: ABNORMAL SECS (ref 58–77)
WBC # BLD AUTO: 6.8 K/UL (ref 4.1–11.1)

## 2022-02-23 PROCEDURE — 74011250636 HC RX REV CODE- 250/636: Performed by: INTERNAL MEDICINE

## 2022-02-23 PROCEDURE — 84100 ASSAY OF PHOSPHORUS: CPT

## 2022-02-23 PROCEDURE — 36415 COLL VENOUS BLD VENIPUNCTURE: CPT

## 2022-02-23 PROCEDURE — 74011250636 HC RX REV CODE- 250/636: Performed by: FAMILY MEDICINE

## 2022-02-23 PROCEDURE — 85730 THROMBOPLASTIN TIME PARTIAL: CPT

## 2022-02-23 PROCEDURE — 85025 COMPLETE CBC W/AUTO DIFF WBC: CPT

## 2022-02-23 PROCEDURE — 83735 ASSAY OF MAGNESIUM: CPT

## 2022-02-23 PROCEDURE — 65660000000 HC RM CCU STEPDOWN

## 2022-02-23 PROCEDURE — 74011250637 HC RX REV CODE- 250/637: Performed by: FAMILY MEDICINE

## 2022-02-23 PROCEDURE — 74011000250 HC RX REV CODE- 250: Performed by: INTERNAL MEDICINE

## 2022-02-23 PROCEDURE — 80048 BASIC METABOLIC PNL TOTAL CA: CPT

## 2022-02-23 RX ORDER — HEPARIN SODIUM 1000 [USP'U]/ML
80 INJECTION, SOLUTION INTRAVENOUS; SUBCUTANEOUS ONCE
Status: COMPLETED | OUTPATIENT
Start: 2022-02-23 | End: 2022-02-23

## 2022-02-23 RX ADMIN — SODIUM CHLORIDE, PRESERVATIVE FREE 10 ML: 5 INJECTION INTRAVENOUS at 22:05

## 2022-02-23 RX ADMIN — HEPARIN SODIUM 9120 UNITS: 1000 INJECTION INTRAVENOUS; SUBCUTANEOUS at 05:28

## 2022-02-23 RX ADMIN — Medication: at 19:30

## 2022-02-23 RX ADMIN — Medication 26 UNITS/KG/HR: at 07:36

## 2022-02-23 NOTE — PROGRESS NOTES
Bedside shift change report given to [facility] (oncoming nurse) by Deanna Matta RN (offgoing nurse). Report included the following information SBAR, Kardex, MAR, Recent Results and Cardiac Rhythm NSR.

## 2022-02-23 NOTE — PROGRESS NOTES
Problem: Pressure Injury - Risk of  Goal: *Prevention of pressure injury  Description: Document Dante Scale and appropriate interventions in the flowsheet. Outcome: Progressing Towards Goal  Note: Pressure Injury Interventions: Activity Interventions: Pressure redistribution bed/mattress(bed type)    Mobility Interventions: Float heels,HOB 30 degrees or less    Nutrition Interventions: Document food/fluid/supplement intake                     Problem: Falls - Risk of  Goal: *Absence of Falls  Description: Document Shila Fall Risk and appropriate interventions in the flowsheet.   Outcome: Progressing Towards Goal  Note: Fall Risk Interventions:  Mobility Interventions: Bed/chair exit alarm,Patient to call before getting OOB         Medication Interventions: Evaluate medications/consider consulting pharmacy    Elimination Interventions: Call light in reach

## 2022-02-23 NOTE — PROGRESS NOTES
Hospitalist Progress Note          Toya Dumont MD  Please call  and page for questions. Call physician on-call through the  7pm-7am    Daily Progress Note: 2/23/2022    Primary care provider:None    Date of admission: 2/20/2022  5:29 PM    Admission summery and hospital course:  42-year-old male without significant past medical history, who presented to outside hospital on 2/20 with complaints of shortness of breath and right lateral rib pain for the last 2 to 3 days. Reportedly, the patient experienced some left calf pain/spasm about 3 days ago.  Subsequently he started experiencing shortness of breath with right-sided rib pain and episodic diaphoresis.  Patient went to urgent care and was given doxycycline for presumed pneumonia.  However given nonresolving symptoms he again seeked medical attention in the ER on 2/20 where CT scan revealed bilateral PE (saddle) with associated right heart strain.  Labs also showed elevated troponin and proBNP; EKG revealed classic S1Q3T3 pattern.  Patient was started on heparin infusion and transfer to [facility] ICU was arranged for possible IR guided intervention. The patient underwent IR guided percutaneous embolectomy on 2/20 and appears to be doing well. Subjective:   Patient said he is doing okay. Patient continues to have pain at his right lower back chest.  Denies any cough, breathing problems or palpitation. Assessment/Plan:   Saddle pulmonary embolism   Deep vein thrombosis left lower extremity, left popliteal vein and in the peroneal vein. S/p percutaneous thrombectomy. Continue heparin gtts and likely transition in the next 24 hours to NOAC. Continue to monitor on telemetry. Obesity   BMI 34, counseling done for lifestyle modification. Patient needs follow-up with outpatient PCP. See orders for other plans. VTE prophylaxis: Heparin drip. Code status: Full code.   Discussed plan of care with Patient/Family and Nurse. Pre-admission lived at home. Discharge planning: pending. Probably to home in 1 or 2 more days. Patient was encouraged for mobilization. Review of Systems:     Review of Systems:  Symptom  Y/N  Comments   Symptom  Y/N  Comments    Fever/Chills  n    Chest Pain  y    Poor Appetite  n    Edema  n     Cough  n   Abdominal Pain  n     Sputum  n   Joint Pain  n    SOB/JOSE  n   Pruritis/Rash      Nausea/vomit  n   Tolerating PT/OT      Diarrhea  n   Tolerating Diet      Constipation     Other      Could not obtain due to:         Objective:   Physical Exam:     Visit Vitals  /78 (BP 1 Location: Left upper arm, BP Patient Position: At rest)   Pulse 62   Temp 98.2 °F (36.8 °C)   Resp 19   Ht 6' (1.829 m)   Wt 114 kg (251 lb 5.2 oz)   SpO2 96%   BMI 34.09 kg/m²    O2 Flow Rate (L/min): 2 l/min (weaned from 3L) O2 Device: None (Room air)    Temp (24hrs), Av.5 °F (36.9 °C), Min:98.2 °F (36.8 °C), Max:98.7 °F (37.1 °C)    No intake/output data recorded.  1901 -  0700  In: 205.1 [I.V.:205.1]  Out: 1350 [XFWAL:0278]      General:  Sitting up at the bedside. Alert, cooperative, no distress, appears stated age. Lungs:    Decreased breath sounds on right lower lung. Clear to auscultation bilaterally. Chest wall:  No tenderness or deformity. Heart:  Regular rate and rhythm, S1, S2 normal, no murmur. Abdomen:   Soft, non-tender. Bowel sounds normal.    Extremities: Extremities normal, atraumatic, no cyanosis or edema. Pulses: 2+ and symmetric all extremities. Skin: Skin color, texture, turgor normal. No rashes or lesions   Neurologic:  Patient has clear voice. He interacts appropriately.        Data Review:       Recent Days:  Recent Labs     22  0250 22  0420 22  0237   WBC 6.8 6.3 8.2   HGB 14.4 13.6 14.0   HCT 44.0 41.5 43.3    134* 127*     Recent Labs     22  0250 22  0420 22  0237 22  2224 22  1351 02/20/22  1351    136 136  --    < > 137   K 3.9 3.7 3.8  --    < > 3.6    108 108  --    < > 107   CO2 24 25 25  --    < > 27   GLU 95 96 120*  --    < > 116*   BUN 14 15 14  --    < > 15   CREA 1.14 0.97 0.97  --    < > 1.28   CA 9.2 9.1 8.6  --    < > 9.6   MG 2.3 2.0  --   --   --   --    PHOS 4.2 3.1  --   --   --   --    ALB  --   --   --   --   --  3.6   ALT  --   --   --   --   --  23   INR  --   --   --  1.3*  --   --     < > = values in this interval not displayed. No results for input(s): PH, PCO2, PO2, HCO3, FIO2 in the last 72 hours.     24 Hour Results:  Recent Results (from the past 24 hour(s))   PTT    Collection Time: 02/22/22 11:48 AM   Result Value Ref Range    aPTT 37.6 (H) 22.1 - 31.0 sec    aPTT, therapeutic range     58.0 - 77.0 SECS   PTT    Collection Time: 02/22/22  7:18 PM   Result Value Ref Range    aPTT 55.0 (H) 22.1 - 31.0 sec    aPTT, therapeutic range     58.0 - 59.9 SECS   METABOLIC PANEL, BASIC    Collection Time: 02/23/22  2:50 AM   Result Value Ref Range    Sodium 137 136 - 145 mmol/L    Potassium 3.9 3.5 - 5.1 mmol/L    Chloride 108 97 - 108 mmol/L    CO2 24 21 - 32 mmol/L    Anion gap 5 5 - 15 mmol/L    Glucose 95 65 - 100 mg/dL    BUN 14 6 - 20 MG/DL    Creatinine 1.14 0.70 - 1.30 MG/DL    BUN/Creatinine ratio 12 12 - 20      GFR est AA >60 >60 ml/min/1.73m2    GFR est non-AA >60 >60 ml/min/1.73m2    Calcium 9.2 8.5 - 10.1 MG/DL   MAGNESIUM    Collection Time: 02/23/22  2:50 AM   Result Value Ref Range    Magnesium 2.3 1.6 - 2.4 mg/dL   PHOSPHORUS    Collection Time: 02/23/22  2:50 AM   Result Value Ref Range    Phosphorus 4.2 2.6 - 4.7 MG/DL   CBC WITH AUTOMATED DIFF    Collection Time: 02/23/22  2:50 AM   Result Value Ref Range    WBC 6.8 4.1 - 11.1 K/uL    RBC 5.21 4.10 - 5.70 M/uL    HGB 14.4 12.1 - 17.0 g/dL    HCT 44.0 36.6 - 50.3 %    MCV 84.5 80.0 - 99.0 FL    MCH 27.6 26.0 - 34.0 PG    MCHC 32.7 30.0 - 36.5 g/dL    RDW 13.5 11.5 - 14.5 %    PLATELET 948 150 - 400 K/uL    MPV 11.9 8.9 - 12.9 FL    NRBC 0.0 0  WBC    ABSOLUTE NRBC 0.00 0.00 - 0.01 K/uL    NEUTROPHILS 40 32 - 75 %    LYMPHOCYTES 46 12 - 49 %    MONOCYTES 10 5 - 13 %    EOSINOPHILS 3 0 - 7 %    BASOPHILS 1 0 - 1 %    IMMATURE GRANULOCYTES 0 0.0 - 0.5 %    ABS. NEUTROPHILS 2.7 1.8 - 8.0 K/UL    ABS. LYMPHOCYTES 3.2 0.8 - 3.5 K/UL    ABS. MONOCYTES 0.7 0.0 - 1.0 K/UL    ABS. EOSINOPHILS 0.2 0.0 - 0.4 K/UL    ABS. BASOPHILS 0.0 0.0 - 0.1 K/UL    ABS. IMM. GRANS. 0.0 0.00 - 0.04 K/UL    DF AUTOMATED     PTT    Collection Time: 02/23/22  2:50 AM   Result Value Ref Range    aPTT 39.0 (H) 22.1 - 31.0 sec    aPTT, therapeutic range     58.0 - 77.0 SECS       Problem List:  Problem List as of 2/23/2022 Never Reviewed          Codes Class Noted - Resolved    Pulmonary embolism ([facility] [address] ICD-10-CM: I26.99  ICD-9-CM: 415.19  2/20/2022 - Present              Medications reviewed  Current Facility-Administered Medications   Medication Dose Route Frequency    heparin 25,000 units in  ml infusion  13-36 Units/kg/hr IntraVENous TITRATE    sodium chloride (NS) flush 5-40 mL  5-40 mL IntraVENous Q8H    sodium chloride (NS) flush 5-40 mL  5-40 mL IntraVENous PRN    acetaminophen (TYLENOL) tablet 650 mg  650 mg Oral Q6H PRN    Or    acetaminophen (TYLENOL) suppository 650 mg  650 mg Rectal Q6H PRN    polyethylene glycol (MIRALAX) packet 17 g  17 g Oral DAILY PRN    ondansetron (ZOFRAN ODT) tablet 4 mg  4 mg Oral Q8H PRN    Or    ondansetron (ZOFRAN) injection 4 mg  4 mg IntraVENous Q6H PRN       Care Plan discussed with: Patient/Family and Nurse    Total time spent with patient: 40 minutes.     Neel Crow MD

## 2022-02-23 NOTE — PROGRESS NOTES
Bedside shift change report given to Cecilia Arroyo (oncoming nurse) by Deon Birmingham RN (offgoing nurse). Report included the following information SBAR and Kardex.

## 2022-02-24 VITALS
DIASTOLIC BLOOD PRESSURE: 84 MMHG | OXYGEN SATURATION: 97 % | HEIGHT: 72 IN | WEIGHT: 251.32 LBS | HEART RATE: 58 BPM | BODY MASS INDEX: 34.04 KG/M2 | TEMPERATURE: 98.1 F | SYSTOLIC BLOOD PRESSURE: 153 MMHG | RESPIRATION RATE: 16 BRPM

## 2022-02-24 LAB
ANION GAP SERPL CALC-SCNC: 2 MMOL/L (ref 5–15)
APTT PPP: 63.6 SEC (ref 22.1–31)
APTT PPP: >130 SEC (ref 22.1–31)
BASOPHILS # BLD: 0 K/UL (ref 0–0.1)
BASOPHILS NFR BLD: 1 % (ref 0–1)
BUN SERPL-MCNC: 12 MG/DL (ref 6–20)
BUN/CREAT SERPL: 11 (ref 12–20)
CALCIUM SERPL-MCNC: 9.2 MG/DL (ref 8.5–10.1)
CHLORIDE SERPL-SCNC: 108 MMOL/L (ref 97–108)
CO2 SERPL-SCNC: 27 MMOL/L (ref 21–32)
CREAT SERPL-MCNC: 1.08 MG/DL (ref 0.7–1.3)
DIFFERENTIAL METHOD BLD: NORMAL
EOSINOPHIL # BLD: 0.1 K/UL (ref 0–0.4)
EOSINOPHIL NFR BLD: 2 % (ref 0–7)
ERYTHROCYTE [DISTWIDTH] IN BLOOD BY AUTOMATED COUNT: 13.5 % (ref 11.5–14.5)
GLUCOSE SERPL-MCNC: 100 MG/DL (ref 65–100)
HCT VFR BLD AUTO: 40.3 % (ref 36.6–50.3)
HGB BLD-MCNC: 13.1 G/DL (ref 12.1–17)
IMM GRANULOCYTES # BLD AUTO: 0 K/UL (ref 0–0.04)
IMM GRANULOCYTES NFR BLD AUTO: 0 % (ref 0–0.5)
LYMPHOCYTES # BLD: 2.8 K/UL (ref 0.8–3.5)
LYMPHOCYTES NFR BLD: 45 % (ref 12–49)
MAGNESIUM SERPL-MCNC: 2 MG/DL (ref 1.6–2.4)
MCH RBC QN AUTO: 27.2 PG (ref 26–34)
MCHC RBC AUTO-ENTMCNC: 32.5 G/DL (ref 30–36.5)
MCV RBC AUTO: 83.8 FL (ref 80–99)
MONOCYTES # BLD: 0.6 K/UL (ref 0–1)
MONOCYTES NFR BLD: 10 % (ref 5–13)
NEUTS SEG # BLD: 2.7 K/UL (ref 1.8–8)
NEUTS SEG NFR BLD: 42 % (ref 32–75)
NRBC # BLD: 0 K/UL (ref 0–0.01)
NRBC BLD-RTO: 0 PER 100 WBC
PHOSPHATE SERPL-MCNC: 4 MG/DL (ref 2.6–4.7)
PLATELET # BLD AUTO: 161 K/UL (ref 150–400)
PMV BLD AUTO: 11.1 FL (ref 8.9–12.9)
POTASSIUM SERPL-SCNC: 3.6 MMOL/L (ref 3.5–5.1)
RBC # BLD AUTO: 4.81 M/UL (ref 4.1–5.7)
SODIUM SERPL-SCNC: 137 MMOL/L (ref 136–145)
THERAPEUTIC RANGE,PTTT: ABNORMAL SECS (ref 58–77)
THERAPEUTIC RANGE,PTTT: ABNORMAL SECS (ref 58–77)
WBC # BLD AUTO: 6.3 K/UL (ref 4.1–11.1)

## 2022-02-24 PROCEDURE — 74011000250 HC RX REV CODE- 250: Performed by: INTERNAL MEDICINE

## 2022-02-24 PROCEDURE — 83735 ASSAY OF MAGNESIUM: CPT

## 2022-02-24 PROCEDURE — 74011250637 HC RX REV CODE- 250/637: Performed by: STUDENT IN AN ORGANIZED HEALTH CARE EDUCATION/TRAINING PROGRAM

## 2022-02-24 PROCEDURE — 36415 COLL VENOUS BLD VENIPUNCTURE: CPT

## 2022-02-24 PROCEDURE — 80048 BASIC METABOLIC PNL TOTAL CA: CPT

## 2022-02-24 PROCEDURE — 74011250636 HC RX REV CODE- 250/636: Performed by: INTERNAL MEDICINE

## 2022-02-24 PROCEDURE — 84100 ASSAY OF PHOSPHORUS: CPT

## 2022-02-24 PROCEDURE — 85025 COMPLETE CBC W/AUTO DIFF WBC: CPT

## 2022-02-24 PROCEDURE — 85730 THROMBOPLASTIN TIME PARTIAL: CPT

## 2022-02-24 RX ORDER — APIXABAN 5 MG (74)
KIT ORAL
Qty: 1 DOSE PACK | Refills: 0 | Status: SHIPPED | OUTPATIENT
Start: 2022-02-24

## 2022-02-24 RX ADMIN — SODIUM CHLORIDE, PRESERVATIVE FREE 10 ML: 5 INJECTION INTRAVENOUS at 07:20

## 2022-02-24 RX ADMIN — Medication 23 UNITS/KG/HR: at 00:42

## 2022-02-24 RX ADMIN — APIXABAN 10 MG: 2.5 TABLET, FILM COATED ORAL at 10:58

## 2022-02-24 NOTE — DISCHARGE INSTRUCTIONS
HOSPITALIST DISCHARGE INSTRUCTIONS    NAME: Saray Rose   :  1965   MRN:  [id number]     Date/Time:  2022 11:43 AM    ADMIT DATE: 2022   DISCHARGE DATE: 2022     Attending Physician: Jordyn Alatorre MD    DISCHARGE DIAGNOSIS:  Saddle pulmonary embolism  Cor pulmonale  Obesity    MEDICATIONS:  See above    · It is important that you take the medication exactly as they are prescribed. · Keep your medication in the bottles provided by the pharmacist and keep a list of the medication names, dosages, and times to be taken in your wallet. · Do not take other medications without consulting your doctor. Pain Management: per above medications    What to do at [address]:  Cardiac Diet    Recommended activity: Activity as tolerated    If you have questions regarding the hospital related prescriptions or hospital related issues please call [facility] at . You can always direct your questions to your primary care doctor if you are unable to reach your hospital physician; your PCP works as an extension of your hospital doctor just like your hospital doctor is an extension of your PCP for your time at [facility]. If you experience any of the following symptoms then please call your primary care physician or return to the emergency room if you cannot get hold of your doctor:  Fever, chills, nausea, vomiting, diarrhea, change in mentation, falling, bleeding, shortness of breath    Additional Instructions:      Bring these papers with you to your follow up appointments. The papers will help your doctors be sure to continue the care plan from the hospital.              Information obtained by :  I understand that if any problems occur once I am at home I am to contact my physician. I understand and acknowledge receipt of the instructions indicated above. Physician's or R.N.'s Signature                                                                  Date/Time                                                                                                                                              Patient or Representative Signature                                                          Da

## 2022-02-24 NOTE — PROGRESS NOTES
Bedside shift change report given to Allison Dumont RN/CAROL Echevarria (oncoming nurse) by Jane Wright RN (offgoing nurse). Report included the following information SBAR and Kardex.

## 2022-02-24 NOTE — PROGRESS NOTES
Problem: Pressure Injury - Risk of  Goal: *Prevention of pressure injury  Description: Document Dante Scale and appropriate interventions in the flowsheet. Outcome: Progressing Towards Goal  Note: Pressure Injury Interventions: Activity Interventions: Pressure redistribution bed/mattress(bed type)    Mobility Interventions: HOB 30 degrees or less    Nutrition Interventions: Discuss nutritional consult with provider                     Problem: Falls - Risk of  Goal: *Absence of Falls  Description: Document Shila Fall Risk and appropriate interventions in the flowsheet.   Outcome: Progressing Towards Goal  Note: Fall Risk Interventions:  Mobility Interventions: Strengthening exercises (ROM-active/passive)         Medication Interventions: Evaluate medications/consider consulting pharmacy    Elimination Interventions: Call light in reach

## 2022-02-24 NOTE — DISCHARGE SUMMARY
Hospitalist Discharge Summary     Patient ID:  Keke Lunsford  [id number]  60 y.o.  1965  2/20/2022    PCP on record: None    Admit date: 2/20/2022  Discharge date and time: 2/24/2022    DISCHARGE DIAGNOSIS:    Saddle pulmonary embolism  Acute DVT  Cor pulmonale  Obesity    CONSULTATIONS:  IP CONSULT TO INTENSIVIST    Excerpted HPI from H&P of Gerri Butler MD:  58-year-old male without significant past medical history, who presented to outside hospital on 2/20 with complaints of shortness of breath and right lateral rib pain for the last 2 to 3 days. Reportedly, the patient experienced some left calf pain/spasm about 3 days ago.  Subsequently he started experiencing shortness of breath with right-sided rib pain and episodic diaphoresis.  Patient went to urgent care and was given doxycycline for presumed pneumonia.  However given nonresolving symptoms he again seeked medical attention in the ER on 2/20 where CT scan revealed bilateral PE (saddle) with associated right heart strain.  Labs also showed elevated troponin and proBNP; EKG revealed classic S1Q3T3 pattern.  Patient was started on heparin infusion and transfer to [facility] ICU was arranged for possible IR guided intervention. The patient underwent IR guided percutaneous embolectomy on 2/20 and appears to be doing well.     ______________________________________________________________________  DISCHARGE SUMMARY/HOSPITAL COURSE:  for full details see H&P, daily progress notes, labs, consult notes. Saddle pulmonary embolism   DVT  Cor pulmonale  Deep vein thrombosis left lower extremity, left popliteal vein and in the peroneal vein. S/p percutaneous thrombectomy. Transition to the Eliquis, give the written prescription hospitalized the case management notified. Obesity   BMI 34, counseling done for lifestyle modification. Patient needs follow-up with outpatient PCP. _______________________________________________________________________  Patient seen and examined by me on discharge day. Pertinent Findings:  Gen:    Not in distress  Chest: Clear lungs  CVS:   Regular rhythm. No edema  Abd:  Soft, not distended, not tender  Neuro:  Alert,   _______________________________________________________________________  DISCHARGE MEDICATIONS:   Current Discharge Medication List      START taking these medications    Details   apixaban (Eliquis DVT-PE Treat 30D Start) 5 mg (74 tabs) starter pack Take 10 mg (two 5 mg tablets) by mouth twice a day for 7 days Followed by 5 mg (one 5 mg tablet) by mouth twice a day  Qty: 1 Dose Pack, Refills: 0  Start date: 2/24/2022      apixaban (Eliquis) 5 mg tablet Take 1 Tablet by mouth two (2) times a day for 30 days. Qty: 60 Tablet, Refills: 0  Start date: 3/26/2022, End date: 4/25/2022               Patient Follow Up Instructions: Activity: Activity as tolerated  Diet: Cardiac Diet  Wound Care: None needed    If you have questions regarding the hospital related prescriptions or hospital related issues please call [phone] [facility] at . You can always direct your questions to your primary care doctor if you are unable to reach your hospital physician; your PCP works as an extension of your hospital doctor just like your hospital doctor is an extension of your PCP for your time at [facility].     If you experience any of the following symptoms then please call your primary care physician or return to the emergency room if you cannot get hold of your doctor:  Fever, chills, nausea, vomiting, diarrhea, change in mentation, falling, bleeding, shortness of breaths     Follow-up Information     Follow up With Specialties Details Why Contact Info    None    None (395) Patient stated that they have no PCP      Marcelino Rodriguez, MD Internal Medicine, Pulmonary Disease In 1 month  [address] [facility] [address]  [phone]          ________________________________________________________________    Risk of deterioration: Low    Condition at Discharge:  Stable  __________________________________________________________________    Disposition  Home with family, no needs    ____________________________________________________________________    Code Status: Full Code  ___________________________________________________________________      Total time in minutes spent coordinating this discharge (includes going over instructions, follow-up, prescriptions, and preparing report for sign off to her PCP) :  >30 minutes    Signed:  Raven Kitchen MD

## 2022-02-25 LAB — F5 GENE MUT ANL BLD/T: NORMAL

## 2022-02-28 LAB — F2 GENE MUT ANL BLD/T: NORMAL

## 2022-03-19 PROBLEM — I26.99 PULMONARY EMBOLISM (HCC): Status: ACTIVE | Noted: 2022-02-20
